# Patient Record
Sex: MALE | Race: BLACK OR AFRICAN AMERICAN
[De-identification: names, ages, dates, MRNs, and addresses within clinical notes are randomized per-mention and may not be internally consistent; named-entity substitution may affect disease eponyms.]

---

## 2019-05-08 NOTE — RAD
PORTABLE CHEST:

 

DATE: 5/8/2019.

 

PROVIDED CLINICAL HISTORY: 

Hypoxia.

 

FINDINGS:

No comparisons.  Cardiac silhouette appears enlarged which may be at least partially on the basis of 
portable technique.  Vascular calcification involves the aortic arch.  Left greater than right basila
r pleural parenchymal opacity.  Prominence of the pulmonary vasculature and pulmonary interstitium.  
No evidence for pneumothorax.  

 

IMPRESSION: 

Cardiomegaly and findings suggesting congestive failure.  Bibasilar left greater than right pleural p
arenchymal opacity may reflect effusions with adjacent atelectasis versus superimposed infection.  Fo
llowup is recommended.

 

POS: OFF

## 2019-05-08 NOTE — CT
CT ABDOMEN AND PELVIS:

 

HISTORY: 

Left-sided abdominal pain.

 

FINDINGS: 

Contrast-enhanced CT images of the abdomen and pelvis were obtained after administration of IV contra
st.  Oral contrast was not given.

 

Moderate-sized bilateral pleural effusions seen.

 

No evidence of free intraperitoneal air is seen.

 

There is a 1.5 cm hypodense area along the anterior aspect of the right hepatic lobe.  A more subtle 
smaller lesion is also seen in the left hepatic lobe measuring approximately 8 mm.  These may represe
nt hepatic cysts or masses including metastatic disease.

 

The gallbladder is unremarkable.  

 

There are numerous dilated loops of small bowel.  Some of these have herniated into a large left ingu
inal hernia extending into the scrotal sac.  This results in proximal left-sided small bowel obstruct
ion.

 

Multilevel lumbar degenerative change is seen.

 

IMPRESSION: 

1.  Large left inguinal hernia with small bowel herniation and obstruction.  Surgical consultation is
 recommended.

 

2.  Bilateral pleural effusions.

 

3.  Hypodense area seen in the liver.  These may represent hepatic cysts or masses.

 

POS: [facility]

## 2019-05-09 NOTE — RAD
Small bowel follow-through



HISTORY: Abdominal pain. Hernia. Bowel obstruction.



FINDINGS:  exam shows a large amount of stool throughout the colon. Prominent degenerative pinedo
es lumbar spine.



Early images show contrast within the nondilated proximal small bowel and within the stomach. At 1 ho
ur, contrast has filled the nondilated small bowel. Contrast is apparent within the right colon

extending to the hepatic flexure.







IMPRESSION: No evidence of bowel obstruction.



Reported By: JERI Fox 

Electronically Signed:  5/9/2019 12:02 PM

## 2019-05-09 NOTE — CON
DATE OF CONSULTATION:  



HISTORY OF PRESENT ILLNESS:  The patient is a 78-year-old  man, who

is currently incarcerated in one of the correctional facilities in our area, who

reportedly had complained of some abdominal pain, that he has taken to the [facility]

there.  He underwent evaluation and was noted to have irregular heart beat and some

hypoxia.  He was transferred to our facility where he underwent evaluation here.  It

was discovered that the patient was having CHF exacerbation, which he had no prior

history of and also was noted on a CT scan to have a large left inguinal hernia,

which we were asked to see in consultation.  The patient had a known hernia since

what he says greater than 5 years and according to his medical records, was noted in

2012.  The patient denies any nausea, vomiting, or diarrhea.  He states that his

bowel habits are regular.  He has a bowel movement every "few days" and his last one

was again "a few days ago."  The patient denies any urinary issues.  His chief

complaint was intermittent abdominal pain.  The patient is not the best historian

and a lot of his history is gleaned from his records from the intermediate facility. 



ALLERGIES:  NONE.



CURRENT MEDICATIONS:  Aspirin, iron supplement, omeprazole, lisinopril, and Novolin.



PAST MEDICAL HISTORY:  Hypertension, type 2 diabetes, hep C, and anemia.



PAST SURGICAL HISTORY:  Hernia repair.



SOCIAL HISTORY:  The patient is currently an inmate at one of the intermediate facilities

locally.  He denies drug, tobacco, or alcohol use. 



REVIEW OF SYSTEMS:  A 10-point review of systems is negative as otherwise stated.

Of note, this patient was examined last night in the emergency department and again

was seen this morning in consultation and this morning, he denies having any

abdominal pain to include yesterday. 



PHYSICAL EXAMINATION:

VITAL SIGNS:  Temperature is 97.7, heart rate 85, blood pressure 124/76,

respirations are 18, and oxygen saturation is 99% on 2 L via nasal cannula. 

GENERAL:  The patient is resting comfortably in bed.  He is awake, alert, and being

cooperative. 

HEENT:  Head is normocephalic and atraumatic.  Eyes, extraocular motion intact.

PERRLA bilaterally.  The patient does have some scleral icterus.  Oropharynx is

clear.  Nose is atraumatic without discharge.  Ears are atraumatic without

discharge. 

NECK:  Nontender.  Trachea is midline.  No JVD.  No lymphadenopathy is noted. 

CHEST:  Shows equal rise and fall of the chest with scattered rhonchi and occasional

wheezes. 

ABDOMEN:  Soft.  Flat with hypoactive bowel sounds.  Groin, a large obvious left

inguinal hernia is noted.  It does appear to slide, but is definitely not completely

reducible.  The patient did not have pain with this manipulation. 

BACK:  Atraumatic and nontender.



LABORATORY FINDINGS:  White blood cell count 8.8, hemoglobin 9.2, hematocrit 33.3,

and platelets are 396.  Sodium 137, potassium 4.1, chloride 103, CO2 of 30, BUN 11,

creatinine 0.69, glucose 117, and magnesium 1.8.  BNP 1844.  Troponin less than

0.010. 



RADIOGRAPHIC FINDINGS:  

1. AP chest x-ray shows cardiomegaly and findings suggestive of congestive heart

failure.  The bibasilar left greater than right pleural parenchymal opacity may

reflect effusion with adjacent atelectasis versus superimposed infection.  CT of the

abdomen and pelvis with IV contrast, one large left inguinal hernia with small-bowel

herniation and obstruction. 

2. Bilateral pleural effusions.

3. Hypodense area seen in the liver.  This may represent hepatic cyst or mass.



ASSESSMENT AND PLAN:  

1. Congestive heart failure exacerbation.

2. Left inguinal hernia with suspected obstruction.



PLAN:  Plan will be to have medical management per the primary team and we will

order a small bowel follow-through to evaluate for obstruction of his hernia.  At

this time, the patient is not a good candidate due to his anasarca and CHF

exacerbation.  We will continue to follow the patient and re-evaluate him after his

small bowel follow-through.  The patient was examined this morning with Dr. Santoyo on

the telemetry floor. 







Job ID:  [id number]

## 2019-05-09 NOTE — PRG
DATE OF SERVICE:  



SUBJECTIVE:  The patient is seen and examined at the bedside.  He does not have much

complaints to offer.  He wants to eat.  He does not have any abdominal pain.  He is

not short of breath. 



OBJECTIVE:  VITAL SIGNS:  Blood pressure is 137/77, pulse is 85, temperature is

97.5, respirations 18, O2 saturation is 100% on room air. 

HEENT:  His head is atraumatic and normocephalic.  Eyes are PERRLA.  Sclerae are

nonicteric.  Oral mucosa is moist. 

NECK:  Supple. 

LUNGS:  Clear. 

HEART:  S1 and S2.  Somewhat irregular.  No S3.  No S4. 

ABDOMEN:  Soft, nontender.  Bowel sounds present.  He has big scrotum, enlarged. 

EXTREMITIES:  He has upper extremity in plastic handcuffs.  He has 2+ peripheral

edema on both lower extremities and he has changes of both feet, which look like

chronic tinea. 

NEUROLOGIC:  He is alert and oriented x4.  There are no any motor deficits.  Cranial

nerves are intact. 



LABORATORY DATA:  Showed INR of 1.3, PT of 15.8, PT 29.1.  Normal electrolytes.  CO2

of 8, BUN 11, creatinine 0.69, glucose 117, calcium 8.6, magnesium 1.8. 



IMPRESSION:  

1. Acute congestive heart failure exacerbation.  The patient is on Lasix and ACE

inhibitor.  Cardiology consultation is still pending.  Echocardiogram is pending. 

2. Uncontrolled diabetes mellitus.  We will try to maximize the treatment.

3. Large left inguinal hiatal hernia with a normal small bowel series, no

obstruction.  Surgery is postponed for the time when he is not in congestive heart

failure. 

4. Bilateral pleural effusion secondary to congestive heart failure, on diuretics.

5. Acute hypoxic respiratory failure, improved.

6. Microcytic anemia.  We will do iron studies and guaiac on his stool.







Job ID:  [id number]

## 2019-05-09 NOTE — HP
PRIMARY CARE DOCTOR:  The patient is in alf.



CODE STATUS:  Full code.



TIME OF EVALUATION:  8:30 p.m.



CHIEF COMPLAINT:  Abdominal pain.



HISTORY OF PRESENT ILLNESS:  Information has been gathered from the nursing 
staff

since the patient is noncooperative.  He is confused.  The patient was brought 
in to

the hospital from correctional facility since the patient was having episodes of

hypoxia, also having irregular heartbeats, no clear triggers, no alleviating

factors.  Reportedly, this was his first episode.  Symptoms were moderate, 
gradual

onset.  Symptoms were getting worse with change in positions. 



REVIEW OF SYSTEMS:  Unable to obtain.  The patient is noncooperative to 
interview.



KNOWN ALLERGIES:  No known drug allergies.



FAMILY HISTORY:  Reviewed and noncontributory for current presentation.



REPORTED MEDICATIONS:  

1. Aspirin.

2. Iron.

3. Omeprazole.

4. Lisinopril.

5. Novolin.



PHYSICAL EXAMINATION:

VITAL SIGNS:  On presentation, blood pressure 142/91 with heart rate 112,

respiratory rate was 17, temperature 97.7, pain 8/10, oxygen saturation was 98% 
on

room air. 

GENERAL APPEARANCE:  The patient is alert, disoriented, not in acute distress. 

HEENT:  Eyes, normal conjunctivae.  Dry oral mucosa.  Anicteric.  No JVD. 

RESPIRATORY:  The patient has decreased air entry.  The patient has bilateral 
rales.

 No wheezing. 

CARDIOVASCULAR:  Normal rate, regular rhythm.  No murmurs.  No gallop.  
Bilateral

leg edema. 

ABDOMEN:  Soft.  Normal bowel sounds. MUSCULOSKELETAL:  Baseline range of 
motion and

strength.  No tenderness. 

SKIN:  Warm, intact.  No pallor.  No rash.  No redness.  Peripheral pulses are

present.  Capillary refill seems to be intact. 

NEUROLOGICAL:  No evidence of any new focal weakness.  Baseline speech.  Cranial

nerves seems to be intact. 

PSYCHIATRIC:  The patient has good mood.  No anxiety.  Suboptimal judgment.



IMAGING STUDIES:  EKG was reviewed.  The patient has sinus tachycardia with PACs
,

nonspecific T-wave abnormalities, ventricular rate 111, , QRS 88, QT 
corrected

454. 



LABORATORY DATA:  Reviewed.  White count 8.8, hemoglobin 9.2, platelet count 
396.

Sodium 137, potassium 4.4, chloride 107, carbon dioxide 22, anion gap 12, BUN 17
,

creatinine 0.72, GFR greater than 90, glucose 192, calcium 9.0, total bilirubin 
0.4,

AST 53, ALT 97.  Troponin was negative.  B-type natriuretic peptide 1844.  Serum

total protein is 7.0, albumin 3.7, globulin 3.3, albumin to globulin ratio 1.1.

Lipase 4. 



ASSESSMENT AND PLAN:  The patient will be placed in the hospital with following

medical problems: 

1. Acute congestive heart failure exacerbation.  The patient improved with Lasix
, we

will continue diuresis, we will reconcile home medications.  We will adjust

treatment as needed. 

2. Microcytic anemia.  The patient has hemoglobin 9.2 with MCV of 69 to be 
secondary

to iron deficiency, might be a component of thalassemia given low MCV.  The 
patient

has hypochromia anisocytosis.  We will start iron, this can be followed as

outpatient. 

3. Uncontrolled diabetes.  The patient presented with glucose of 192, reconcile 
home

medications, sliding scale for optimal control. 

4. Large left inguinal hernia with small bowel herniation and obstruction.  
Surgery

was consulted and was endorsed that Surgery might be willing to take him to OR 
in

the morning. 

5. Bilateral pleural effusion secondary to congestive heart failure.  We will

continue 

diuresis.

6. Acute hypoxic respiratory failure, likely secondary to congestive heart 
failure.

We 

will treat underlying condition.

7. Deep venous thrombosis prophylaxis.







Job ID:  [id number]



[facility]

## 2019-05-09 NOTE — CON
DATE OF CONSULTATION:  05/09/2019



REASON FOR CONSULTATION:  Heart failure.



HISTORY OF PRESENT ILLNESS:  Mr. Lopez is a very pleasant 78-year-old 

American gentleman, who is an inmate, who comes to the hospital for being altered

and having episodes of irregular heartbeats and shortness of breath.  He was

admitted and there was a concern for abdominal obstruction, but this was ruled out

with a follow-through, and he was started on IV Lasix as his clinical scenario

seemed to be heart failure.  He was given Lasix and felt much better.

Echocardiogram was done and it showed an EF of 15% to 20%, so Cardiology has been

consulted for this.  On my evaluation, Mr. Lopez denies any chest pain, tightness,

or pressure.  He tells me he has never been told he has a weak heart in the past.

He feels much better and is able to lay flat now. 



PAST MEDICAL HISTORY:  

1. Hypertension.

2. Type 2 diabetes.

3. Hepatitis C.

4. Chronic anemia.



OUTPATIENT MEDICATIONS:  

1. Omeprazole 40 mg b.i.d.

2. Novolin R.

3. NPH insulin.

4. Lisinopril 20 mg a day.

5. Ferrous sulfate 325 mg b.i.d.

6. Aspirin 81 a day.



ALLERGIES:  NO KNOWN DRUG ALLERGIES.



SOCIAL HISTORY:  Currently, an inmate.  No alcohol, tobacco, or drugs.



FAMILY HISTORY:  Noncontributory.



REVIEW OF SYSTEMS:  A 12-point review of systems was done and was found to be

negative unless stated in the history of present illness. 



PHYSICAL EXAMINATION:

VITAL SIGNS:  Temperature 97.5, pulse 84, respiratory rate 16, saturating 97% on

room air, and blood pressure 140/85. 

GENERAL:  Awake, alert, and oriented x3.  No distress. 

HEENT:  Normocephalic and atraumatic. 

NECK:  Supple. 

LUNGS:  Clear. 

CARDIOVASCULAR:  S1 and S2.  No S3 or S4.  No murmurs. 

ABDOMEN:  Soft.  Positive bowel sounds. 

EXTREMITIES:  1+ edema. 

SKIN:  Warm and dry.



LABORATORY DATA:  Laboratory work was reviewed.  CBC with a white count of 8.8,

hemoglobin of 9.2, hematocrit of 33, and platelet count of 396.  Coags were normal.

Chemistries with a BUN of 11, creatinine 0.69, GFR of greater than 90.  Lactic acid

was normal.  BNP was 1844. 



IMAGING STUDIES:  Echocardiogram was reviewed, it showed an EF of 15% to 20%, grade

2/3 diastolic dysfunction, and global hypokinesis. 



ASSESSMENT:  

1. New-onset dilated cardiomyopathy.

2. Acute on chronic systolic heart failure.



PLAN:  

1. We will need further risk stratification with heart catheterization for ischemic

cardiomyopathy.  We will plan on doing this tomorrow.  We spoke about the risks and

benefits of the procedure.  Risks included, but not limited to stroke, MI, death,

bleeding, need for blood transfusion, limb loss, organ loss, need for emergent

bypass surgery.  He understands and verbalized understanding and agrees to 

proceed.

2. We will get a LifeVest before discharge.

3. Start on beta blocker, already on an ACE inhibitor.

4. Further recommendations per results of coronary angiogram.  We will follow.







Job ID:  [id number]

## 2019-05-10 NOTE — PRG
DATE OF SERVICE:  05/10/2019



SUBJECTIVE:  The patient is seen and examined at the bedside.  He just came back

from cardiac catheterization procedure.  He does not have much complaints to offer.

He just ate his lunch. 



OBJECTIVE:  VITAL SIGNS:  Blood pressure is 119/77, pulse is 75, temperature is

97.7, respiratory rate 14, O2 saturation is 97% on room air. 

HEENT:  His head is atraumatic and normocephalic.  Sclerae are nonicteric.  Pupils

are responding to light properly.  Oral mucosa is moist. 

NECK:  Supple. 

LUNGS:  Breath sounds diminished at both bases with few crackles bilaterally.  No

wheezing. 

HEART:  S1, S2, somewhat distant.  No S3.  No S4. 

ABDOMEN:  Soft, nontender, nondistended. 

EXTREMITIES:  Upper and lower extremity in plastic cuffs.  He has 2+ peripheral

edema on both lower extremities. 

NEUROLOGICAL:  He is alert and oriented x4.  There are no any motor deficits.

Cranial nerves are intact. 



LABORATORY DATA:  Labs showed glycemia is ranging from 79 to 201.  INR 1.3.  PT

15.8, APTT 29.1.  Sodium of 137, potassium 4.1, chloride 103, CO2 of 30, BUN 11,

creatinine 0.69.  Echocardiogram showed LVEF of 15% to 20%.  Grade 2/3 diastolic

dysfunction.  Global hypokinesia, dilated RV with reduced RV systolic function.

Severely dilated left atrium, markedly enlarged right atrium.  Moderate mitral

regurgitation, moderate tricuspid regurgitation, elevated right ventricular systolic

pressure estimated at 58 mmHg.  Moderate-size pleural effusion. 



IMPRESSION:  

1. Acute congestive heart failure exacerbation.  Left ventricular ejection fraction

low on his echo.  He just came back from cardiac cath.  We are waiting for final

report. 

2. Uncontrolled diabetes mellitus.

3. Large left hiatal hernia with a normal small bowel series suggestive of no

obstruction and no surgical intervention recommended per surgical team at this

point. 

4. Bilateral pleural effusion secondary to congestive heart failure, on diuretics.

5. Acute hypoxemic respiratory failure, improved.

6. Microcytic anemia, iron deficiency.  Awaiting for guaiac stool.  We will obtain

GI consult for possible scoping and diuretics twice a day.  Continue ACE inhibitor.

Continue beta-blocker.  Continue insulin, Lantus, NPH, and sliding scale, and

aspirin 81 mg. 







Job ID:  [id number]

## 2019-05-11 NOTE — CON
DATE OF CONSULTATION:  05/11/2019



REASON FOR CONSULTATION:  Iron-deficiency anemia.



HISTORY OF PRESENT ILLNESS:  Matheus Lopez is a 78-year-old 

gentleman, an inmate, who was admitted to the hospital 3 days ago with altered

mental status, hypoxia, dyspnea, and irregular heartbeat.  Upon presentation, there

was also concern for possible incarceration of a long-standing left inguinal hernia.

 He was evaluated by surgery and had normal small bowel follow-through and hernia

was not felt to be in danger of incarceration.  He had some cardiac workup including

echocardiogram and this demonstrated significant dilated cardiomyopathy with

ejection fraction only 15% to 20% and global hypokinesis.  Cardiology saw the

patient and performed a cardiac catheterization.  He does not have significant

coronary artery disease, so this is classified as a nonischemic cardiomyopathy.

Arrangements are being made for the patient to have a LifeVest.  He has received IV

Lasix.  During evaluation, it is also apparent that the patient is anemic.  It is

unclear how chronic this is.  The patient himself denies any knowledge of a history

of anemia.  Hemoglobin is stable at 9.3, and it is microcytic with MCV of 68.2.

Iron studies are low with ferritin only 10.5.  FOBT is negative.  The patient states

that he has no gastrointestinal symptoms at all.  He denies any heartburn, abdominal

pain, nausea, vomiting, diarrhea, constipation, melena, or hematochezia.  He denies

any overt bleeding from anywhere.  No epistaxis.  No gross hematuria.  He is not on

any special diet.  He does not think he has ever undergone EGD.  He does think he

had a colonoscopy that was normal and it would have been several years ago.  He is

not interested in undergoing any endoscopic procedures and is wanting to minimize

further interventions and testing if possible. 



PAST MEDICAL HISTORY:  Hypertension, diabetes type 2, hepatitis C, chronic anemia,

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

effusion. 



ALLERGIES:  NO KNOWN DRUG ALLERGIES.



OUTPATIENT MEDICATIONS:  

1. Omeprazole 40 mg b.i.d.

2. Novolin R.

3. NPH insulin.

4. Lisinopril 20 mg daily.

5. Ferrous sulfate 325 mg b.i.d.

6. Aspirin 81 mg daily.



SOCIAL HISTORY:  Currently, an inmate.  No alcohol, tobacco, or drug use.



FAMILY HISTORY:  Noncontributory.  No known family history of GI malignancy.



REVIEW OF SYSTEMS:  Full review of systems including constitutional, head, eyes,

ears, nose, throat, GI, , cardiovascular, respiratory, musculoskeletal, and

neurologic systems are negative except as noted in the HPI. 



PHYSICAL EXAMINATION:

VITAL SIGNS:  Temperature 98.3, pulse 93, blood pressure 120/64, and 97% oxygen

saturation on room air. 

GENERAL:  A 78-year-old man, lying in bed comfortably, in no distress. 

MENTAL:  He is alert and oriented.  He is able to answer detailed questions about

current symptoms. 

SKIN:  He is a bit pale.  No jaundice.  No rashes were palpable. 

EYES:  No scleral icterus.  Extraocular movements intact. 

ENT:  Mucous membranes moist.  No oral lesions. 

LYMPH:  No submandibular supraclavicular lymphadenopathy.  Thyroid nontender to

palpation. 

HEART:  Regular rate and rhythm. 

LUNGS:  Clear to auscultation bilaterally. 

ABDOMEN:  Flat.  Bowel sounds present.  Soft and nontender to palpation throughout. 

EXTREMITIES:  No peripheral edema. 

VESSELS:  Radial pulses 2+ bilaterally. 

NEUROLOGIC:  Cranial nerves 2 through 12 intact bilaterally.  No focal deficits.



LABORATORY STUDIES:  Hemoglobin 9.3, WBC 9.0, platelets 181, MCV is low at 68.2.

INR is 1.3.  Sodium 136, potassium 3.6, BUN 18, creatinine 0.67, glucose 260,

calcium 8.5, iron less than 8, TIBC 301, ferritin only 10.5.  BNP elevated to

1844.9.  Total bilirubin 0.4, alkaline phosphatase 135, AST 53, ALT 97, lipase 4,

and albumin 3.7. 



IMAGING STUDIES:  Echocardiogram demonstrates global hypokinesis and ejection

fraction only 15% to 20%.  Coronary angiogram shows no significant coronary artery

disease.  On 05/09/2019, small bowel follow-through showed no evidence of any bowel

obstruction.  CT of the abdomen and pelvis on admission 05/08/2019, showed normal

gallbladder.  There are a couple of hypodense areas in the liver measuring up to 1.5

cm representing either hepatic cyst or possibly masses including metastatic disease.

 Gallbladder is unremarkable.  Some loops of small bowel within left inguinal hernia

sac extending into the scrotum. 



ASSESSMENT AND PLAN:  

1. Iron-deficiency anemia.

2. Indeterminate hepatic lesions, possibly representing cyst versus solid tumors

including possibly malignant disease. 

3. Severe nonischemic cardiomyopathy with ejection fraction 15% to 20%.  I had a

long discussion with the patient regarding his iron-deficiency anemia.  This is

concerning for possible occult gastrointestinal bleeding lesion.  I note his FOBT is

negative, but this does not rule out an occult gastrointestinal bleeding lesion.

The patient would be high risk for any endoscopic procedure with his severe

congestive heart failure, but I would be willing to consider

esophagogastroduodenoscopy and colonoscopy if the patient wanted to proceed.  The

patient expresses understanding of the risks and benefits either way, and he

strongly declines any endoscopic investigation.  He is wanting to minimize any

further testing.  He expresses understanding of the risk of missing significant

pathology.  I would honor his wishes in this regard.  I did tell him that if he

changes his mind, then we can be reconsulted for consideration of

esophagogastroduodenoscopy and colonoscopy.  I would need formal cardiac clearance

prior to proceeding if that were the case. 



GI will sign off at this time given the patient's wishes, but please call back

anytime with questions or concerns. 







Job ID:  [id number]

## 2019-05-11 NOTE — PRG
DATE OF SERVICE:  05/11/2019



SUBJECTIVE:  The patient is seen and examined at the bedside.  Two guards present,

one of them is in the room and one is in front of the room.  The patient feels tired

and he could not sleep last night and he is trying to catch up on his sleep this

morning. 



OBJECTIVE:  VITAL SIGNS:  Blood pressure is 127/82, temperature is 97.4, pulse is

98, respirations 14, and O2 saturation is 100% on room air. 

GENERAL:  He follows my commands. 

HEENT:  His head is atraumatic.  Sclerae are nonicteric.  Oral mucosa is moist. 

NECK:  Supple. 

LUNGS:  Breath sounds diminished at both bases with some dullness on percussion on

both bases.  No wheezing. 

HEART:  S1 and S2, somewhat distant.  No S3.  No S4. 

ABDOMEN:  Soft, nontender, and nondistended. 

EXTREMITIES:  Approximately 2+ peripheral edema around both ankles.  He has quite

diffuse changes on his both feet, which looks chronic. 

NEUROLOGICAL:  He is alert and oriented x4.  There are no any motor deficits.



LABORATORY DATA:  White count of 9.0, hemoglobin is 9.3, hematocrit 33.8, and

platelet count is 181,000.  Normal electrolytes, normal creatinine, glycemia is

ranging from 79 to 276, and calcium is 8.5.  Microbiology, KOH preparation on skin

of his feet came back negative for fungal infection. 



IMPRESSION AND PLAN:  

1. Acute congestive heart failure exacerbation with low left ventricular ejection

fraction, status post cardiac cath.  We are waiting for the final report. 

2. Uncontrolled diabetes mellitus.  He is on 6 units of NPH twice a day.  I will

increase morning dose to 10 units and continue 6 units in the evening. 

3. Large left inguinal hernia without obstruction.

4. Bilateral pleural effusions secondary to congestive heart failure.

5. Acute hypoxemic respiratory failure, improved.

6. Microcytic anemia, iron deficiency with negative guaiac stool.  GI consult

placed.  For now, we will continue his current regimen with ACE inhibitor,

beta-blocker. 





Job ID:  [id number]

## 2019-05-12 NOTE — PRG
DATE OF SERVICE:  05/12/2019



SUBJECTIVE:  I was called back to see Mr. Lopez this morning because of acute onset

abdominal pain.  The patient reports that the pain is throughout his entire abdomen.

 It is really unable to localize it for me.  This is associated with nausea.  He did

have one episode of nonbloody emesis already.  I do note he has this large left

inguinal hernia which persists and I am unable to reduce in any way. 



OBJECTIVE:  VITAL SIGNS:  Temperature 98.1, pulse 87, blood pressure 136/66, 100%

oxygen saturation on room air. 

GENERAL:  A 78-year-old man, lying in bed, in moderate distress from pain and

nausea. 

HEART:  Regular rate and rhythm. 

LUNGS:  Clear to auscultation bilaterally. 

ABDOMEN:  The abdomen is nondistended, but it is tense.  He endorses diffuse

tenderness to palpation.  Bowel sounds are hypoactive.  He has a large left inguinal

hernia which is not reducible. 

EXTREMITIES:  No peripheral edema.



LABORATORY STUDIES:  WBC 7.3, hemoglobin 8.5, platelets 288.  INR 1.3.  Sodium 136,

potassium 3.1, BUN 18, creatinine 0.64. 



ASSESSMENT/PLAN:  

1. Acute generalized abdominal pain this morning.

2. Large left-sided inguinal hernia.  Note on patient's presentation a few days ago

there was concern for small bowel obstruction due to his large left inguinal hernia.

 This was apparent on initial CT imaging on 05/08/2018.  By the time of small bowel

follow-through 05/09/2019, there was no further evidence of obstruction.  However, I

think his pain today most likely represents repeat incarceration and obstruction.  I

will call the surgical team and requests them to re-evaluate.  Obviously, the

patient is not a great operative candidate. 

3. Iron deficiency anemia.  The patient strongly declined any endoscopic

investigation 

with regard to his iron deficiency anemia.  Again, the patient is not a great

candidate for any procedures.  The plan had been to transfer him to [facility] for 

LifeVest, and I would still prefer this to be accomplished prior to any plan for

endoscopy.  Hemoglobin is stable. 







Job ID:  [id number]

## 2019-05-12 NOTE — PRG
DATE OF SERVICE:  05/12/2019



SUBJECTIVE:  Mr. Lopez is a 78-year-old  man, an inmate of a

correctional facility. 



The patient was admitted with acute congestive heart failure and vague abdominal

pain associated with a longstanding left inguinal hernia for over five years

duration. 



The congestive heart failure has been successfully treated. 



The patient ultimately began to have bowel movement.  Small bowel followthrough

revealed no small bowel obstruction. 



Herniorrhaphy was not recommended at that time. 



This morning; however, the patient is complaining of severe left groin pain

associated with a large nonreducible bulge down to his scrotum. 



He is complaining of some nausea with some nonbilious emesis x2.



OBJECTIVE:  VITAL SIGNS:  Today includes blood pressure 177/74, pulse 106,

respiratory rate is 20, temperature 97.7 degrees Fahrenheit, and oxygen saturation

97% on room air. 

HEART:  Reveals irregular rate and irregular rhythm. 

LUNGS:  Clear to auscultation bilaterally.  Breathing, regular and nonlabored. 

ABDOMEN:  Soft and nondistended.  He has a large nonreducible bulge to his left

scrotum consistent with an incarcerated left inguinal hernia. 

NEUROLOGIC:  Reveals no focal deficits present.



LABORATORY FINDINGS:  Today include a CBC with 7300 white blood cells, hemoglobin

and hematocrit 8.5 and 30.0 respectively. 



Platelet count is 288,000. 



Metabolic profile; sodium 136, potassium 3.1, chloride is 99, bicarb 32, BUN 18,

creatinine 0.64, and glucose is 146. 



IMPRESSION:  Incarcerated left inguinal hernia.



PLAN:  Left inguinal herniorrhaphy with mesh. 



Above findings and plan discussed with the patient, who indicates understanding of

information given. 



We will obtain consent for surgical intervention from the correctional facility.







Job ID:  [id number]

## 2019-05-12 NOTE — PRG
DATE OF SERVICE:  05/12/2019



SUBJECTIVE:  The patient is seen and examined at the bedside.  He complains about

abdominal pain, quite severe all of a sudden, but apparently he was doing quite well

this morning. 



OBJECTIVE:  VITAL SIGNS:  Blood pressure is 177/74, pulse is 106, temperature is

97.7, respiratory rate is 20, and O2 saturation is 97% on room air. 

GENERAL:  He is in quite severe pain, it is in the abdomen.  His eyes are not

showing any abnormalities. 

HEENT:  His sclerae are nonicteric.  Oral mucosa is moist. 

NECK:  Supple. 

LUNGS:  Clear. 

HEART:  S1 and S2, somewhat tachycardic.  No S3.  No S4. 

ABDOMEN:  Tender.  The pain is more localized to the left side of the abdomen and

most likely this is incarcerated hernia. 

EXTREMITIES:  No clubbing, cyanosis, or edema.



LABORATORY DATA:  White count of 7.3, hemoglobin of 8.5, hematocrit 67.2, and

platelet count is 288,000.  Sodium of 136, potassium 3.1, chloride 99, CO2 of 32,

BUN 18, creatinine 0.64, and glucose 55. 



IMPRESSION:  

1. Abdominal pain, most likely incarcerated hernia.  Apparently, Dr. Barrientos talked

already to Dr. Santoyo and the patient is going for surgery very soon.  We will try to

use some morphine to relieve the pain for now, 4 mg IV push. 

2. Hypokalemia.  We will replace with two runs of KCl.

3. Acute congestive heart failure exacerbation with lower left ventricular ejection

fraction, status post cardiac catheterization.  Apparently, there were no blockages

in his coronary artery disease, so this is nonischemic cardiomyopathy. 

4. Uncontrolled diabetes mellitus.  The patient was hypoglycemic.  This morning, we

will have to watch this closely since we went up on his long-acting insulin

recently. 

5. Bilateral pleural effusion secondary to his congestive heart failure.

6. Acute hypoxemic respiratory failure, improved.

7. Microcytic anemia with iron deficiency and positive guaiac stool.



DISCUSSION:  The patient is going for surgery by General Surgery for his most likely

incarcerated inguinal hernia.  We will replace his potassium with two runs of KCl 10

mEq each.  We just learned that he cannot have LifeVest through our system and he

needs to be transferred to [facility] in [place] or [place], so this transfer has

to be postponed until his surgery is done and he is ready to be transferred.  He is

more stable.  Also, he will need GI scoping since he has microcytic anemia and

guaiac-positive stools. 







Job ID:  [id number]

## 2019-05-13 NOTE — PRG
DATE OF SERVICE:  05/13/2019



SUBJECTIVE:  The patient denies any new complaints.  No new chest pain, shortness of

breath, palpitations.  He feels generally weak and fatigued. 



MEDICATIONS:  Current medications were reviewed.  The patient is on aspirin, Lasix,

lisinopril, Toprol-XL, with NPH. 



REVIEW OF SYSTEMS:  The patient has no bowel movement for last 2 days.  No nausea,

vomiting.  Poor appetite. 



OBJECTIVE:  VITAL SIGNS:  Temperature 98, pulse 87, respirations of 15, blood

pressure of 116/64, O2 saturation 100% on room air. 

GENERAL:  A 78-year-old male, in no apparent distress. 

LUNGS:  Showed diminished air entry at bilateral bases with few rales at bases. 

HEART:  S1, S2 present.  Regular rate and rhythm. 

ABDOMEN:  Soft.  Bowel sounds present. 

NEUROLOGIC:  Grossly nonfocal.



LABORATORY FINDINGS:  WBC 14, hemoglobin 8.6, hematocrit 30, platelet count 272.

Sodium 135, potassium 4, BUN 15, and creatinine 0.76. 



Telemetry monitoring by my review showed sinus rhythm.



IMPRESSION:  

1. Acute systolic and diastolic heart failure exacerbation with ejection fraction of

15% to 20% secondary to nonischemic cardiomyopathy. 

2. Incarcerated left inguinal hernia, status post repair on 05/12/2019.

3. Iron-deficiency anemia.

4. Bilateral pleural effusion secondary to #1.

5. Diabetes mellitus type 2.

6. Chronic hepatitis C.

7. Physical deconditioning.



PLAN:  The patient will be transferred to [facility] for further management.  The patient

will need LifeVest due to cardiomyopathy.  We will continue MiraLAX.  We will

probably change Lasix to oral in a.m.  We will continue beta-blockers.  Continue ACE

inhibitor.  The patient was counseled on congestive heart failure.  We will recheck

labs in a.m.  Continue all other medications as below. 







Job ID:  [id number]

## 2019-05-13 NOTE — PDOC.CTH
Cardiology Progress Note





- Subjective





No new issues. 





- Objective


 Vital Signs











  Temp Pulse Resp BP BP Pulse Ox


 


 05/13/19 17:17  98.0 F  87  15   116/64  100


 


 05/13/19 12:33  97.9 F  98  15   111/65  100


 


 05/13/19 09:47     125/70  


 


 05/13/19 09:45  98.0 F  97  16   125/70  100








 











Admit Weight                   171 lb


 


Weight                         171 lb














 











 05/12/19 05/13/19 05/14/19





 06:59 06:59 06:59


 


Intake Total 1020 1430 365


 


Output Total  1050 625


 


Balance 1020 380 -260














- Physical Examination


General/Neuro: alert & oriented x3, NAD


Neck: no JVD present


Lungs: unlabored respirations


Heart: RRR


Abdomen: NT/ND


Extremities: other: (no edema)





- Telemetry


Telemetry Rhythm: NSR





- Labs


Result Diagrams: 


 05/13/19 04:21





 05/13/19 04:21


 Troponin/CKMB











Troponin I  0.010 ng/mL (< 0.028)   05/08/19  13:06    














- Assessment/Plan





1. Non ischemic CM


2. EF at 20-25%


3. S/P hernia repair. 





PLAN:


- Lasix to PO BID scheduled. 


- Continue BB and ACEI. 


- He cannot have a lifevest from us, he needs to go back to his residential and it 

will be arranged from there. 


- From cardiac perspective he is on medical therapy and may be discharged back 

to his unit with follow up with Cardiologist at [facility].


- He will need close follow up as he is high risk for re admission.

## 2019-05-13 NOTE — OP
DATE OF PROCEDURE:  05/12/2019



PREOPERATIVE DIAGNOSIS:  Incarcerated large left inguinal hernia.



POSTOPERATIVE DIAGNOSES:  

1. Incarcerated large indirect left inguinal hernia.

2. Large direct inguinal hernia, not incarcerated.



OPERATION PERFORMED:  Repair of incarcerated left inguinal hernia with PerFix light

plug. 



ANESTHESIA:  General endotracheal.



ESTIMATED BLOOD LOSS:  10 mL.



FLUIDS GIVEN:  1200 mL crystalloids.



COUNTS:  Sponge and instrument counts were verified as correct x2.



COMPLICATIONS:  None apparent at the time of operation.



INDICATIONS FOR OPERATION:  A 78-year-old man, an inmate of correctional facility,

with long-standing large left inguinal hernia. 



The patient presented with acute and large bulge in his left groin with severe pain

and failure to reduce. 



An incarcerated left inguinal hernia is diagnosed and the patient was brought to the

operating room for repair. 



Findings are consistent with giant incarcerated indirect left inguinal hernia, as

well as large non incarcerated direct left inguinal hernia. 



DESCRIPTION OF PROCEDURE:  Informed consent was obtained from the patient.  He was

brought to the operating room and placed in supine position. 



Following general anesthesia, abdomen including the groin was sterilely prepped and

draped in usual fashion.  The scrotum was separately prepped and draped. 



An oblique incision was made in the left groin using a 15 scalpel.  Incision was

carried through subcutaneous tissues maintaining hemostasis. 



Superficial vessels were divided and cauterized. 



Denisse's fascia was incised along the line of the incision. 



External oblique aponeurosis was completely attenuated. 



The patient was placed in a Trendelenburg position and the large indirect hernia was

reduced. 



The hernia sac and cord structures were dissected free from surrounding structures

and encircled with a Penrose drain. 



The large indirect hernia sac was then dissected off the cord structures.  Care was

taken to avoid injuries to the contents of cord. 



Vas deferens was identified and placed out of harm's way with remainder of the cord

structures. 



Large hernia sac was opened and contents were reduced into the peritoneal cavity. 



This was twisted and divided the high inguinal position and the stump was ligated

using a stick tie of 0 Vicryl and doubly ligated with a free tie of 0 Vicryl. 



The large direct hernia defect was also identified. 



At this juncture, the plug part of the PerFix plug mesh was placed in the internal

ring and sutured circumferentially to internal oblique aponeurosis. 



The elliptical mesh piece was then brought into the operative field.  The apex was

sutured to the pubic tubercle and allowed to encircle the cord. 



This was sutured laterally to the external oblique fascia and medially to the

internal oblique aponeurosis. 



This was achieved using running stitch of 2-0 Prolene. 



What was left of the external oblique aponeurosis was loosely approximated over the

repair using running stitch of 0 Vicryl suture. 



Denisse's fascia was approximated using interrupted sutures of 2-0 Vicryl. 



Wound bed was infiltrated with 0.25% Marcaine with epinephrine. 



Skin incision was closed using a running stitch of 4-0 Monocryl suture in

subcuticular fashion. 



Dermabond was applied over incisional closure. 



The patient tolerated the operation without any apparent complication and was

returned to recovery room in satisfactory condition. 







Job ID:  [id number]

## 2019-05-13 NOTE — PRG
DATE OF SERVICE:  05/13/2019



SUBJECTIVE:  This is a 78-year-old gentleman, inmate of correctional facility.  The

patient was admitted for acute congestive heart failure and vague abdominal pain

associated with long-standing left inguinal hernia for over the past five years.

The patient is postoperative day #1, repair of incarcerated left inguinal hernia

with PerFix light plug.  The patient had no overnight events. 



OBJECTIVE:  VITAL SIGNS:  Temperature 98.0, pulse 97, respirations are 16, SpO2 of

100% on room air, and blood pressure 125/70. 

GENERAL:  The patient is awake and alert, in no distress. 

HEART:  Irregular rhythm. 

LUNGS:  Clear to auscultation, breathing regular and nonlabored. 

ABDOMEN:  Soft, nontender, and nondistended.  Incision well approximated with no

signs of drainage or infection. 

NEUROLOGIC:  No focal deficits.



LABORATORY DATA:  WBC 14.0, RBC 4.45, hemoglobin 8.6, hematocrit 30.0, and platelets

are 272.  Sodium 135, potassium 4.0, chloride 99, BUN 15, creatinine 0.76, estimated

GFR greater than 90, calcium 8.6, glucose 215. 



Diagnostics:  Surgical specimen pending.



IMPRESSION:  Left incarcerated inguinal hernia repair.



PLAN:  From a surgical standpoint, the patient can be discharged.  No heavy lifting

greater than 25 pounds for 2 weeks.  The patient is to follow up in 2 weeks with

Trauma. 



The patient was examined with Dr. Zamora during morning rounds.







Job ID:  [id number]

## 2019-05-14 NOTE — DIS
DATE OF ADMISSION:  05/08/2019



DATE OF DISCHARGE:  05/14/2019



DISCHARGE DISPOSITION:  The patient is an inmate.



DISCHARGE INSTRUCTIONS:  

1. The patient was advised to follow up with Cardiology as well as General Surgery

at [facility].  No heavy lifting for 2 weeks. 

2. The patient was extensively counseled on congestive heart failure.



DISCHARGE MEDICATIONS:  

1. Lasix 40 mg b.i.d.

2. Toprol-XL 25 mg daily.

3. Multivitamin one tablet daily.

4. MiraLAX daily.

5. Senokot-S 1 tablet b.i.d.

6. Lisinopril 20 mg daily.

7. Omeprazole 40 mg b.i.d.

8. Novolin R sliding scale, Novolin N 6 units b.i.d.

9. Ferrous sulfate 325 mg b.i.d.

10. Aspirin 81 mg daily. 



The patient was seen and examined on the day of discharge.  Denies any new

complaints.  No chest pain, shortness of breath, or palpitations reported. 



LifeVest to be arranged by the cardiologist at [facility].



BRIEF HOSPITAL COURSE:  The patient is a 78-year-old male, who presented on March 08, 2019 with abdominal discomfort.  His workup was consistent with congestive heart

failure exacerbation along with large left inguinal hernia along with incarceration.

 He was monitored on the telemetry unit.  He was seen by Cardiology as well as

General Surgery.  Echocardiogram was obtained that showed ejection fraction of 15%

to 20% with grade 2/3 diastolic dysfunction, moderate mitral regurgitation, mild

aortic regurgitation, and moderate tricuspid regurgitation.  A cardiac

catheterization was performed on May 10, 2019 that showed no significant coronary

artery disease.  LifeVest was recommended, however, this needs to be arranged by

[facility]. 



The patient also underwent repair of the incarcerated left inguinal hernia on May

12, 2019.  The patient has been cleared by General Surgery as well as Cardiology for

discharge.  He was extensively counseled on congestive heart failure.  His weight on

the day of discharge is 173 pounds.  He is saturating 100% on room air. 



FINAL DIAGNOSES:  

1. Acute systolic and diastolic heart failure exacerbation with ejection fraction

15% to 20% secondary to nonischemic cardiomyopathy.  ACC stage C. 

2. Incarcerated left inguinal hernia, status post repair.

3. Iron-deficiency anemia.

4. Bilateral pleural effusion secondary to #1.

5. Diabetes mellitus type 2.

6. Chronic hepatitis C.

7. Physical deconditioning.

8. Hyponatremia.

9. Hypokalemia.

10. __________.

11. Abnormal LFTs secondary to passive hepatic congestion.

12. Chronic anemia.

13. Bacteriuria without pyuria.

14. Moderate tricuspid and mitral regurgitation.

15. Mild aortic regurgitation.



TIME SPENT WITH PATIENT:  Total time coordinating the discharge of this patient was

37 minutes. 







Job ID:  [id number]

## 2020-05-02 ENCOUNTER — HOSPITAL ENCOUNTER (EMERGENCY)
Dept: HOSPITAL 18 - NAV ERS | Age: 80
LOS: 1 days | Discharge: ANOTHER HOSPITAL | End: 2020-05-03
Payer: COMMERCIAL

## 2020-05-02 DIAGNOSIS — Z79.891: ICD-10-CM

## 2020-05-02 DIAGNOSIS — E11.649: ICD-10-CM

## 2020-05-02 DIAGNOSIS — Z79.82: ICD-10-CM

## 2020-05-02 DIAGNOSIS — D64.9: ICD-10-CM

## 2020-05-02 DIAGNOSIS — I46.9: Primary | ICD-10-CM

## 2020-05-02 DIAGNOSIS — F03.90: ICD-10-CM

## 2020-05-02 DIAGNOSIS — T68.XXXA: ICD-10-CM

## 2020-05-02 DIAGNOSIS — M19.90: ICD-10-CM

## 2020-05-02 DIAGNOSIS — E78.00: ICD-10-CM

## 2020-05-02 DIAGNOSIS — I11.0: ICD-10-CM

## 2020-05-02 DIAGNOSIS — I48.91: ICD-10-CM

## 2020-05-02 DIAGNOSIS — I50.9: ICD-10-CM

## 2020-05-02 DIAGNOSIS — Z79.899: ICD-10-CM

## 2020-05-02 DIAGNOSIS — E78.5: ICD-10-CM

## 2020-05-02 DIAGNOSIS — K74.60: ICD-10-CM

## 2020-05-02 DIAGNOSIS — Z79.4: ICD-10-CM

## 2020-05-02 LAB
ALBUMIN SERPL BCG-MCNC: 3.4 G/DL (ref 3.4–4.8)
ALP SERPL-CCNC: 171 U/L (ref 40–110)
ALT SERPL W P-5'-P-CCNC: 76 U/L (ref 8–55)
ANION GAP SERPL CALC-SCNC: 24 MMOL/L (ref 10–20)
APTT PPP: 48.5 SEC (ref 22.9–36.1)
AST SERPL-CCNC: 38 U/L (ref 5–34)
BILIRUB SERPL-MCNC: 0.2 MG/DL (ref 0.2–1.2)
BUN SERPL-MCNC: 22 MG/DL (ref 8.4–25.7)
CA-I BLDV-SCNC: 1.09 MMOL/L
CALCIUM SERPL-MCNC: 8.4 MG/DL (ref 7.8–10.44)
CHLORIDE BLDV-SCNC: 99 MMOL/L (ref 98–107)
CHLORIDE SERPL-SCNC: 101 MMOL/L (ref 98–107)
CO2 BLDV CALC-SCNC: 22.4 MMOL/L (ref 22–28)
CO2 SERPL-SCNC: 18 MMOL/L (ref 23–31)
CREAT CL PREDICTED SERPL C-G-VRATE: 0 ML/MIN (ref 70–130)
GLOBULIN SER CALC-MCNC: 3.3 G/DL (ref 2.4–3.5)
GLUCOSE SERPL-MCNC: 199 MG/DL (ref 83–110)
HCO3 BLDV-SCNC: 20.2 MMOL/L (ref 22–28)
HCT VFR BLD CALC: 39 % (ref 42–52)
HGB BLD CALC-MCNC: 13.3 G/DL (ref 14–18)
HGB BLD-MCNC: 11.3 G/DL (ref 14–18)
INR PPP: 1.6
MCH RBC QN AUTO: 28.1 PG (ref 27–31)
MCV RBC AUTO: 93.8 FL (ref 78–98)
MDIFF COMPLETE?: YES
PCO2 BLDV: 70.3 MMHG (ref 40–50)
PLATELET # BLD AUTO: 143 THOU/UL (ref 130–400)
POTASSIUM BLDV-SCNC: 3.8 MMOL/L (ref 3.5–5.1)
POTASSIUM SERPL-SCNC: 4 MMOL/L (ref 3.5–5.1)
PROTHROMBIN TIME: 18.6 SEC (ref 12–14.7)
RBC # BLD AUTO: 4.01 MILL/UL (ref 4.7–6.1)
SAO2 % BLDV FROM PO2: 98.5 % (ref 60–85)
SODIUM BLDV-SCNC: 136 MMOL/L (ref 138–145)
SODIUM SERPL-SCNC: 139 MMOL/L (ref 136–145)
WBC # BLD AUTO: 14 THOU/UL (ref 4.8–10.8)

## 2020-05-02 PROCEDURE — U0002 COVID-19 LAB TEST NON-CDC: HCPCS

## 2020-05-02 PROCEDURE — 96375 TX/PRO/DX INJ NEW DRUG ADDON: CPT

## 2020-05-02 PROCEDURE — 85610 PROTHROMBIN TIME: CPT

## 2020-05-02 PROCEDURE — 36416 COLLJ CAPILLARY BLOOD SPEC: CPT

## 2020-05-02 PROCEDURE — 51703 INSERT BLADDER CATH COMPLEX: CPT

## 2020-05-02 PROCEDURE — 80053 COMPREHEN METABOLIC PANEL: CPT

## 2020-05-02 PROCEDURE — 85730 THROMBOPLASTIN TIME PARTIAL: CPT

## 2020-05-02 PROCEDURE — 82803 BLOOD GASES ANY COMBINATION: CPT

## 2020-05-02 PROCEDURE — 82330 ASSAY OF CALCIUM: CPT

## 2020-05-02 PROCEDURE — 87635 SARS-COV-2 COVID-19 AMP PRB: CPT

## 2020-05-02 PROCEDURE — 84484 ASSAY OF TROPONIN QUANT: CPT

## 2020-05-02 PROCEDURE — 96374 THER/PROPH/DIAG INJ IV PUSH: CPT

## 2020-05-02 PROCEDURE — 93005 ELECTROCARDIOGRAM TRACING: CPT

## 2020-05-02 PROCEDURE — 85025 COMPLETE CBC W/AUTO DIFF WBC: CPT

## 2020-05-03 ENCOUNTER — HOSPITAL ENCOUNTER (INPATIENT)
Dept: HOSPITAL 92 - ERS | Age: 80
LOS: 8 days | DRG: 637 | End: 2020-05-11
Attending: FAMILY MEDICINE | Admitting: FAMILY MEDICINE
Payer: COMMERCIAL

## 2020-05-03 VITALS — BODY MASS INDEX: 22.8 KG/M2

## 2020-05-03 DIAGNOSIS — R40.2122: ICD-10-CM

## 2020-05-03 DIAGNOSIS — R40.2312: ICD-10-CM

## 2020-05-03 DIAGNOSIS — R32: ICD-10-CM

## 2020-05-03 DIAGNOSIS — Z20.828: ICD-10-CM

## 2020-05-03 DIAGNOSIS — I42.8: ICD-10-CM

## 2020-05-03 DIAGNOSIS — K74.60: ICD-10-CM

## 2020-05-03 DIAGNOSIS — G93.1: ICD-10-CM

## 2020-05-03 DIAGNOSIS — N39.0: ICD-10-CM

## 2020-05-03 DIAGNOSIS — R40.2212: ICD-10-CM

## 2020-05-03 DIAGNOSIS — Z78.1: ICD-10-CM

## 2020-05-03 DIAGNOSIS — T83.518A: ICD-10-CM

## 2020-05-03 DIAGNOSIS — E11.649: Primary | ICD-10-CM

## 2020-05-03 DIAGNOSIS — I34.0: ICD-10-CM

## 2020-05-03 DIAGNOSIS — Z79.4: ICD-10-CM

## 2020-05-03 DIAGNOSIS — D64.89: ICD-10-CM

## 2020-05-03 DIAGNOSIS — F03.90: ICD-10-CM

## 2020-05-03 DIAGNOSIS — I46.8: ICD-10-CM

## 2020-05-03 DIAGNOSIS — M19.90: ICD-10-CM

## 2020-05-03 DIAGNOSIS — I50.43: ICD-10-CM

## 2020-05-03 DIAGNOSIS — Z79.82: ICD-10-CM

## 2020-05-03 DIAGNOSIS — I11.0: ICD-10-CM

## 2020-05-03 DIAGNOSIS — B18.2: ICD-10-CM

## 2020-05-03 DIAGNOSIS — J96.01: ICD-10-CM

## 2020-05-03 DIAGNOSIS — K59.00: ICD-10-CM

## 2020-05-03 DIAGNOSIS — Z51.5: ICD-10-CM

## 2020-05-03 DIAGNOSIS — Z79.899: ICD-10-CM
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
## 2020-05-03 DIAGNOSIS — Z66: ICD-10-CM

## 2020-05-03 LAB
ANALYZER IN CARDIO: (no result)
ANION GAP SERPL CALC-SCNC: 15 MMOL/L (ref 10–20)
ANION GAP SERPL CALC-SCNC: 16 MMOL/L (ref 10–20)
ANION GAP SERPL CALC-SCNC: 16 MMOL/L (ref 10–20)
ANION GAP SERPL CALC-SCNC: 19 MMOL/L (ref 10–20)
APTT PPP: 27.9 SEC (ref 22.9–36.1)
APTT PPP: 31 SEC (ref 22.9–36.1)
APTT PPP: 35.4 SEC (ref 22.9–36.1)
APTT PPP: 36.1 SEC (ref 22.9–36.1)
BASE EXCESS STD BLDA CALC-SCNC: -7.3 MEQ/L
BASOPHILS # BLD AUTO: 0 THOU/UL (ref 0–0.2)
BASOPHILS NFR BLD AUTO: 0 % (ref 0–1)
BASOPHILS NFR BLD AUTO: 0.1 % (ref 0–1)
BASOPHILS NFR BLD AUTO: 0.1 % (ref 0–1)
BASOPHILS NFR BLD AUTO: 0.3 % (ref 0–1)
BUN SERPL-MCNC: 24 MG/DL (ref 8.4–25.7)
BUN SERPL-MCNC: 29 MG/DL (ref 8.4–25.7)
CA-I BLDA-SCNC: 1.06 MMOL/L (ref 1.12–1.3)
CALCIUM SERPL-MCNC: 8.1 MG/DL (ref 7.8–10.44)
CALCIUM SERPL-MCNC: 8.1 MG/DL (ref 7.8–10.44)
CALCIUM SERPL-MCNC: 8.3 MG/DL (ref 7.8–10.44)
CALCIUM SERPL-MCNC: 8.4 MG/DL (ref 7.8–10.44)
CHLORIDE SERPL-SCNC: 105 MMOL/L (ref 98–107)
CHLORIDE SERPL-SCNC: 105 MMOL/L (ref 98–107)
CHLORIDE SERPL-SCNC: 106 MMOL/L (ref 98–107)
CHLORIDE SERPL-SCNC: 106 MMOL/L (ref 98–107)
CK MB SERPL-MCNC: 19.8 NG/ML (ref 0–6.6)
CK MB SERPL-MCNC: 30.6 NG/ML (ref 0–6.6)
CK MB SERPL-MCNC: 33.3 NG/ML (ref 0–6.6)
CK MB SERPL-MCNC: 43.6 NG/ML (ref 0–6.6)
CO2 SERPL-SCNC: 19 MMOL/L (ref 23–31)
CO2 SERPL-SCNC: 19 MMOL/L (ref 23–31)
CO2 SERPL-SCNC: 20 MMOL/L (ref 23–31)
CO2 SERPL-SCNC: 20 MMOL/L (ref 23–31)
COMM CRITICAL RESULTS DOC: (no result)
COMM CRITICAL RESULTS DOC: (no result)
CREAT CL PREDICTED SERPL C-G-VRATE: 79 ML/MIN (ref 70–130)
CREAT CL PREDICTED SERPL C-G-VRATE: 82 ML/MIN (ref 70–130)
CREAT CL PREDICTED SERPL C-G-VRATE: 84 ML/MIN (ref 70–130)
CREAT CL PREDICTED SERPL C-G-VRATE: 86 ML/MIN (ref 70–130)
DRUG SCREEN CUTOFF: (no result)
EOSINOPHIL # BLD AUTO: 0 THOU/UL (ref 0–0.7)
EOSINOPHIL NFR BLD AUTO: 0.1 % (ref 0–10)
EOSINOPHIL NFR BLD AUTO: 0.1 % (ref 0–10)
EOSINOPHIL NFR BLD AUTO: 0.2 % (ref 0–10)
EOSINOPHIL NFR BLD AUTO: 0.2 % (ref 0–10)
GLUCOSE SERPL-MCNC: 142 MG/DL (ref 83–110)
GLUCOSE SERPL-MCNC: 156 MG/DL (ref 83–110)
GLUCOSE SERPL-MCNC: 163 MG/DL (ref 83–110)
GLUCOSE SERPL-MCNC: 165 MG/DL (ref 83–110)
GLUCOSE UR STRIP-MCNC: 300 MG/DL
HCO3 BLDA-SCNC: 20.2 MEQ/L (ref 22–28)
HCT VFR BLDA CALC: 33 % (ref 42–52)
HGB BLD-MCNC: 12.4 G/DL (ref 14–18)
HGB BLD-MCNC: 12.5 G/DL (ref 14–18)
HGB BLD-MCNC: 12.8 G/DL (ref 14–18)
HGB BLD-MCNC: 13 G/DL (ref 14–18)
HGB BLDA-MCNC: 11.3 G/DL (ref 14–18)
INR PPP: 1.3
INR PPP: 1.4
LYMPHOCYTES # BLD: 0.8 THOU/UL (ref 1.2–3.4)
LYMPHOCYTES # BLD: 0.9 THOU/UL (ref 1.2–3.4)
LYMPHOCYTES # BLD: 0.9 THOU/UL (ref 1.2–3.4)
LYMPHOCYTES # BLD: 1 THOU/UL (ref 1.2–3.4)
LYMPHOCYTES NFR BLD AUTO: 4.6 % (ref 21–51)
LYMPHOCYTES NFR BLD AUTO: 4.9 % (ref 21–51)
LYMPHOCYTES NFR BLD AUTO: 5.9 % (ref 21–51)
LYMPHOCYTES NFR BLD AUTO: 6.1 % (ref 21–51)
MAGNESIUM SERPL-MCNC: 1.9 MG/DL (ref 1.6–2.6)
MAGNESIUM SERPL-MCNC: 2 MG/DL (ref 1.6–2.6)
MAGNESIUM SERPL-MCNC: 2 MG/DL (ref 1.6–2.6)
MAGNESIUM SERPL-MCNC: 2.1 MG/DL (ref 1.6–2.6)
MCH RBC QN AUTO: 28.4 PG (ref 27–31)
MCH RBC QN AUTO: 28.4 PG (ref 27–31)
MCH RBC QN AUTO: 28.8 PG (ref 27–31)
MCH RBC QN AUTO: 29 PG (ref 27–31)
MCV RBC AUTO: 91 FL (ref 78–98)
MCV RBC AUTO: 91.7 FL (ref 78–98)
MCV RBC AUTO: 92.4 FL (ref 78–98)
MCV RBC AUTO: 94.4 FL (ref 78–98)
MEDTOX CONTROL LINE VALID?: (no result)
MEDTOX READER #: (no result)
MONOCYTES # BLD AUTO: 1.5 THOU/UL (ref 0.11–0.59)
MONOCYTES # BLD AUTO: 1.6 THOU/UL (ref 0.11–0.59)
MONOCYTES # BLD AUTO: 1.6 THOU/UL (ref 0.11–0.59)
MONOCYTES # BLD AUTO: 1.7 THOU/UL (ref 0.11–0.59)
MONOCYTES NFR BLD AUTO: 10.2 % (ref 0–10)
MONOCYTES NFR BLD AUTO: 9.3 % (ref 0–10)
MONOCYTES NFR BLD AUTO: 9.5 % (ref 0–10)
MONOCYTES NFR BLD AUTO: 9.5 % (ref 0–10)
NEUTROPHILS # BLD AUTO: 13.3 THOU/UL (ref 1.4–6.5)
NEUTROPHILS # BLD AUTO: 13.3 THOU/UL (ref 1.4–6.5)
NEUTROPHILS # BLD AUTO: 14.6 THOU/UL (ref 1.4–6.5)
NEUTROPHILS # BLD AUTO: 16 THOU/UL (ref 1.4–6.5)
NEUTROPHILS NFR BLD AUTO: 83.4 % (ref 42–75)
NEUTROPHILS NFR BLD AUTO: 84.1 % (ref 42–75)
NEUTROPHILS NFR BLD AUTO: 85.5 % (ref 42–75)
NEUTROPHILS NFR BLD AUTO: 85.9 % (ref 42–75)
PCO2 BLDA: 49.4 MMHG (ref 35–45)
PH BLDA: 7.23 [PH] (ref 7.35–7.45)
PLATELET # BLD AUTO: 142 THOU/UL (ref 130–400)
PLATELET # BLD AUTO: 143 THOU/UL (ref 130–400)
PLATELET # BLD AUTO: 153 THOU/UL (ref 130–400)
PLATELET # BLD AUTO: 187 THOU/UL (ref 130–400)
PO2 BLDA: 207.2 MMHG (ref 70–?)
POTASSIUM BLD-SCNC: 3.96 MMOL/L (ref 3.7–5.3)
POTASSIUM SERPL-SCNC: 3.8 MMOL/L (ref 3.5–5.1)
POTASSIUM SERPL-SCNC: 3.9 MMOL/L (ref 3.5–5.1)
POTASSIUM SERPL-SCNC: 4.6 MMOL/L (ref 3.5–5.1)
POTASSIUM SERPL-SCNC: 5.2 MMOL/L (ref 3.5–5.1)
PROT UR STRIP.AUTO-MCNC: 70 MG/DL
PROTHROMBIN TIME: 15.7 SEC (ref 12–14.7)
PROTHROMBIN TIME: 16 SEC (ref 12–14.7)
PROTHROMBIN TIME: 16 SEC (ref 12–14.7)
PROTHROMBIN TIME: 17.3 SEC (ref 12–14.7)
RBC # BLD AUTO: 4.26 MILL/UL (ref 4.7–6.1)
RBC # BLD AUTO: 4.39 MILL/UL (ref 4.7–6.1)
RBC # BLD AUTO: 4.46 MILL/UL (ref 4.7–6.1)
RBC # BLD AUTO: 4.58 MILL/UL (ref 4.7–6.1)
RBC UR QL AUTO: (no result) HPF (ref 0–3)
SODIUM SERPL-SCNC: 135 MMOL/L (ref 136–145)
SODIUM SERPL-SCNC: 136 MMOL/L (ref 136–145)
SODIUM SERPL-SCNC: 137 MMOL/L (ref 136–145)
SODIUM SERPL-SCNC: 140 MMOL/L (ref 136–145)
SPECIMEN DRAWN FROM PATIENT: (no result)
TROPONIN I SERPL DL<=0.01 NG/ML-MCNC: 0.98 NG/ML (ref ?–0.03)
TROPONIN I SERPL DL<=0.01 NG/ML-MCNC: 1.49 NG/ML (ref ?–0.03)
TROPONIN I SERPL DL<=0.01 NG/ML-MCNC: 1.84 NG/ML (ref ?–0.03)
TROPONIN I SERPL DL<=0.01 NG/ML-MCNC: 2.4 NG/ML (ref ?–0.03)
WBC # BLD AUTO: 15.9 THOU/UL (ref 4.8–10.8)
WBC # BLD AUTO: 15.9 THOU/UL (ref 4.8–10.8)
WBC # BLD AUTO: 17 THOU/UL (ref 4.8–10.8)
WBC # BLD AUTO: 18.6 THOU/UL (ref 4.8–10.8)
WBC UR QL AUTO: (no result) HPF (ref 0–3)

## 2020-05-03 PROCEDURE — 83605 ASSAY OF LACTIC ACID: CPT

## 2020-05-03 PROCEDURE — 36556 INSERT NON-TUNNEL CV CATH: CPT

## 2020-05-03 PROCEDURE — 85610 PROTHROMBIN TIME: CPT

## 2020-05-03 PROCEDURE — 85379 FIBRIN DEGRADATION QUANT: CPT

## 2020-05-03 PROCEDURE — 82805 BLOOD GASES W/O2 SATURATION: CPT

## 2020-05-03 PROCEDURE — 87086 URINE CULTURE/COLONY COUNT: CPT

## 2020-05-03 PROCEDURE — 36415 COLL VENOUS BLD VENIPUNCTURE: CPT

## 2020-05-03 PROCEDURE — 80053 COMPREHEN METABOLIC PANEL: CPT

## 2020-05-03 PROCEDURE — 84145 PROCALCITONIN (PCT): CPT

## 2020-05-03 PROCEDURE — 82728 ASSAY OF FERRITIN: CPT

## 2020-05-03 PROCEDURE — 84100 ASSAY OF PHOSPHORUS: CPT

## 2020-05-03 PROCEDURE — 96366 THER/PROPH/DIAG IV INF ADDON: CPT

## 2020-05-03 PROCEDURE — S0028 INJECTION, FAMOTIDINE, 20 MG: HCPCS

## 2020-05-03 PROCEDURE — 3E033XZ INTRODUCTION OF VASOPRESSOR INTO PERIPHERAL VEIN, PERCUTANEOUS APPROACH: ICD-10-PCS | Performed by: FAMILY MEDICINE

## 2020-05-03 PROCEDURE — 93306 TTE W/DOPPLER COMPLETE: CPT

## 2020-05-03 PROCEDURE — 83735 ASSAY OF MAGNESIUM: CPT

## 2020-05-03 PROCEDURE — 85730 THROMBOPLASTIN TIME PARTIAL: CPT

## 2020-05-03 PROCEDURE — 87040 BLOOD CULTURE FOR BACTERIA: CPT

## 2020-05-03 PROCEDURE — 80048 BASIC METABOLIC PNL TOTAL CA: CPT

## 2020-05-03 PROCEDURE — 93005 ELECTROCARDIOGRAM TRACING: CPT

## 2020-05-03 PROCEDURE — 84484 ASSAY OF TROPONIN QUANT: CPT

## 2020-05-03 PROCEDURE — 5A1945Z RESPIRATORY VENTILATION, 24-96 CONSECUTIVE HOURS: ICD-10-PCS | Performed by: FAMILY MEDICINE

## 2020-05-03 PROCEDURE — 85025 COMPLETE CBC W/AUTO DIFF WBC: CPT

## 2020-05-03 PROCEDURE — 8E0ZXY6 ISOLATION: ICD-10-PCS | Performed by: FAMILY MEDICINE

## 2020-05-03 PROCEDURE — 71045 X-RAY EXAM CHEST 1 VIEW: CPT

## 2020-05-03 PROCEDURE — 82553 CREATINE MB FRACTION: CPT

## 2020-05-03 PROCEDURE — 83615 LACTATE (LD) (LDH) ENZYME: CPT

## 2020-05-03 PROCEDURE — 81001 URINALYSIS AUTO W/SCOPE: CPT

## 2020-05-03 PROCEDURE — 36416 COLLJ CAPILLARY BLOOD SPEC: CPT

## 2020-05-03 PROCEDURE — 86140 C-REACTIVE PROTEIN: CPT

## 2020-05-03 PROCEDURE — 87635 SARS-COV-2 COVID-19 AMP PRB: CPT

## 2020-05-03 PROCEDURE — U0003 INFECTIOUS AGENT DETECTION BY NUCLEIC ACID (DNA OR RNA); SEVERE ACUTE RESPIRATORY SYNDROME CORONAVIRUS 2 (SARS-COV-2) (CORONAVIRUS DISEASE [COVID-19]), AMPLIFIED PROBE TECHNIQUE, MAKING USE OF HIGH THROUGHPUT TECHNOLOGIES AS DESCRIBED BY CMS-2020-01-R: HCPCS

## 2020-05-03 PROCEDURE — 94003 VENT MGMT INPAT SUBQ DAY: CPT

## 2020-05-03 PROCEDURE — 80306 DRUG TEST PRSMV INSTRMNT: CPT

## 2020-05-03 PROCEDURE — 96365 THER/PROPH/DIAG IV INF INIT: CPT

## 2020-05-03 RX ADMIN — VANCOMYCIN HYDROCHLORIDE SCH MLS: 1 INJECTION, POWDER, LYOPHILIZED, FOR SOLUTION INTRAVENOUS at 05:34

## 2020-05-03 RX ADMIN — INSULIN LISPRO PRN UNIT: 100 INJECTION, SOLUTION INTRAVENOUS; SUBCUTANEOUS at 11:54

## 2020-05-03 RX ADMIN — VANCOMYCIN HYDROCHLORIDE SCH MLS: 1 INJECTION, POWDER, LYOPHILIZED, FOR SOLUTION INTRAVENOUS at 16:33

## 2020-05-03 RX ADMIN — INSULIN LISPRO PRN UNIT: 100 INJECTION, SOLUTION INTRAVENOUS; SUBCUTANEOUS at 17:41

## 2020-05-03 RX ADMIN — FAMOTIDINE SCH MG: 10 INJECTION, SOLUTION INTRAVENOUS at 07:29

## 2020-05-03 RX ADMIN — FAMOTIDINE SCH MG: 10 INJECTION, SOLUTION INTRAVENOUS at 19:56

## 2020-05-03 NOTE — CON
DATE OF CONSULTATION:  05/03/2020



REASON FOR CONSULTATION:  Cardiac arrest.



HISTORY OF PRESENT ILLNESS:  Mr. Lopez is a 79-year-old  gentleman,

who is an inmate, who had an out-of-hospital cardiac arrest.  He went to the residential

nurse in the [facility].  He was found to have a blood sugar of 416, so he was given

5 units of NPH and 12 units of regular insulin.  Later that day, he was not feeling

well, went back and his sugar was at 30.  They gave him 2 glucose tabs and a repeat

level was 22.  A shot of glucagon was planned, and attempts to get an IV were

fruitless as he became combative and eventually went unconscious.  CPR was started.

EMS was called.  On arrival, he was in asystole.  His blood sugar was back up to 60

at that point.  He was intubated and brought in for further evaluation.  He did have

1 run of ventricular tachycardia during his CPR, but this was not the initial

rhythm.  Currently, on my evaluation, he is sedated and intubated, unable to provide

any history. 



PAST MEDICAL HISTORY:  

1. Nonischemic cardiomyopathy.  At last evaluation, EF was 15% to 20% back in May

2019.  At that time, he had a normal heart catheterization and was sent home on a

LifeVest. 

2. Osteoarthritis.

3. Type 2 diabetes.

4. Urine incontinence.

5. Chronic anemia.

6. Dementia.

7. Hepatitis C with cirrhosis.



PAST SURGICAL HISTORY:  Left inguinal hernia repair.



FAMILY HISTORY:  Noncontributory.



SOCIAL HISTORY:  Per chart review.  No alcohol, tobacco, or drugs.



OUTPATIENT MEDICATIONS:  

1. Aspirin 81 mg a day.

2. Insulin NPH 6 units b.i.d.

3. Regular insulin 100 units.

4. Lisinopril 5 mg a day.

5. Atorvastatin 10 mg at bedtime.

6. Carvedilol 12.5 mg b.i.d.

7. Lasix 40 mg a day.

8. Sofosbuvir with velpatasvir daily.

9. Spironolactone 25 mg b.i.d.



REVIEW OF SYSTEMS:  Unobtainable as the patient is sedated and intubated.



PHYSICAL EXAMINATION:

VITAL SIGNS:  Temperature 93.2, he is being cooled currently, heart rate is 72,

respiratory rate 22, saturating 100% on 40% FiO2 blood, blood pressure currently is

170/85. 

GENERAL:  Sedated, intubated. 

LUNGS:  Clear. 

CARDIOVASCULAR:  S1, S2.  There is a grade 2/6 systolic murmur at the right upper

sternal border. 

ABDOMEN:  Soft.  Positive bowel sounds. 

EXTREMITIES:  No edema. 

SKIN:  Cool and dry.



LABORATORY DATA:  Laboratory work was reviewed.  COVID PCR was negative.  CBC with a

white count of 15, hemoglobin of 12, hematocrit of 39, platelet count of 143.  Coags

were unremarkable.  ABG was reviewed. Chemistries were reviewed.  Troponin was 0.98

and 2.4 with CK-MB of 19, up to 33.  Potassium was 5.2, down to 4.6.  Normal BUN and

creatinine.  GFR was greater than 90.  UA was 2+ blood.  Urine drug screen was

completely normal.  Urine culture is negative at 12 hours. 



IMAGING STUDIES:  Chest x-ray showed left anterior 2nd rib fracture and small amount

of pulmonary edema. 



ASSESSMENT:  

1. Out-of-hospital cardiac arrest.

2. Asystole.

3. Hypoglycemic arrest most likely.

4. Nonischemic cardiomyopathy with an EF of 15% to 20% at last evaluation.



PLAN:  

1. Get an echocardiogram to look at LV function and valvular structures.

2. If LV function remains reduced and he does well mentation wise, he may be a

candidate for an AICD before leaving the hospital.  We will wait for neurologic

recovery before this happens. 

3. Continue outpatient regimen for now for heart failure.  His heart failure

medicines are adequate.  May change his ACE inhibitor for Entresto in the next few

days if he recovers neurologically. 

Thank you for letting us participate in the care of your patient.  We will follow.







Job ID:  [id number]

## 2020-05-03 NOTE — PDOC.FPRHP
- History of Present Illness


Chief Complaint: unrepsonsive


History of Present Illness: 





79YOM with a PMH notable for IDDMII, combined HF, and chronic hep C w/ 

cirrhosis who was brought in by EMS after being found unresponsive at the 

prison earlier yesterday evening. Per the prison nurse, the patient presented 

to the [facility] and was given 5U NPH and 12U regular insulin for hyperglycemia 

with a BG of 416. However, later that day, the patient was reportedly "not 

feeling well" and was found to be hypoglycemic at 30 & was given 2 oral glucose 

tabs. A repeat BG level following the glucose tabs was still low at 22 so the 

nurse gave him a shot of glucagon and tried to start an IV. The patient then 

reportedly became combative and then unconscious so CPR was started & EMS was 

called. Per EMS, on their arrival to the prison, CPR was underway & the patient'

s BG was still low at 60. His initial rhythm was asystole & CPR was continued 

during which time he was also sedated & intubated. A R humeral IO was also 

placed & en route to the [place] ER he was shocked at 200J for Vtach & given epi 

x5, 80mg bicarb, 1 bag D10, & 200 ketamine for sedation. He was then given 

another 20mcg of epi IVP. In the [place] ER he was given 80mg of Brayden and 4mg of 

Versed & before being transferred to [facility] for further management.  


ED Course: 





In the [facility] ER the patient was started on a levophed drip through his R 

humeral IO. Central line placement was attempted x4 & was unsuccessful. 





- Allergies/Adverse Reactions


 Allergies











Allergy/AdvReac Type Severity Reaction Status Date / Time


 


No Known Allergies Allergy   Verified 05/08/19 21:44














- Home Medications


 











 Medication  Instructions  Recorded  Confirmed  Type


 


Acetaminophen [Tylenol Suppository] 325 mg RI Q4H PRN  supp 05/09/20  Rx


 


Bisacodyl [Dulcolax] 10 mg RI DAILYPRN PRN  supp 05/09/20  Rx


 


Lorazepam [Ativan] 1 mg SLOW IVP Q1H PRN  vial 05/09/20  Rx


 


Morphine 4 mg SLOW IVP Q2H PRN  vial 05/09/20  Rx


 


Polyethylene Glycol 3350 [Miralax] 17 gm PER TUBE DAILY  pk 05/09/20  Rx


 


Scopolamine [Transderm Scop] 1.5 mg TD Q3D PRN  patch 05/09/20  Rx


 


Sennosides/Docusate Sodium 1 tab PO BID  tab 05/09/20  Rx





[Senokot S]    














- History


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 

nonischemic CM (EF 15-20% per May of 2019 ECHO), osteoarthritis, IDDM type 2, 

urinary incontinence 





 PSHx: Left inguinal hernia repair (May of 2019)   





FHx: unknown


 


Social: unknown


 








- Review of Systems


ROS unobtainable: due to endotracheal tube





- Vital signs


BP: 159/111  HR: 94 RR: 22 Tmax: 91.4F Pox: 100% on SIMV mode (, 40% FiO2

, PEEP 8, Ps 10)  Wt: 74 kg   








- Physical Exam


Constitutional: NAD, other (intubated)


HEENT: normocephalic and atraumatic, other (NG & OT tubes in place)


Heart: RRR, normal S1/S2


Lungs: CTAB, no respiratory distress, good air movement, no rales/rhonchi, no 

wheezing, other


Abdomen: soft, bowel sounds present, other (mildly distended)


Musculoskeletal: normal structure


Neurological: other (unable to fully assess 2/2 intubation; gag reflex intact)


Skin: no rash/lesions, good turgor


Psychiatric: other (unable to fully assess 2/2 intubation)





FMR H&P: Results





- Labs


Result Diagrams: 


 05/06/20 05:35





 05/06/20 05:35


Lab results: 


 











ABG pH  7.23  (7.35-7.45)  L*  05/03/20  01:02    


 


ABG pCO2  49.4 mmHg (35.0-45.0)  H  05/03/20  01:02    


 


ABG pO2  207.2 mmHg (> 70.0)  H  05/03/20  01:02    








[facility] Labs:


WBC 14


Hgb 11.3


Hct 37.6


Plts 143


3 bands


54 lymphocytes





Na 139


K 4.0


Cl 101


HCO3 18


BUN 22


Cr 0.93








- EKG Interpretation


EKG: 





NSR @ 65 bmp w/ prolonged QT of 536





- Radiology Interpretation


  ** Chest x-ray


Status: image reviewed by me (multifocal, non-peripheral airspace opacities vs. 

LAD)





FMR H&P: A/P





- Problem List


(1) Cardiopulmonary arrest with successful resuscitation


Status: Acute   Code(s): I46.9 - CARDIAC ARREST, CAUSE UNSPECIFIED   





(2) Person under investigation for COVID-19


Status: Acute   Code(s): Z20.828 - CONTACT W AND EXPOSURE TO OTH VIRAL 

COMMUNICABLE DISEASES   





(3) Hepatitis C


Status: Chronic   Code(s): B19.20 - UNSPECIFIED VIRAL HEPATITIS C WITHOUT 

HEPATIC COMA   





(4) Cirrhosis


Status: Chronic   Code(s): K74.60 - UNSPECIFIED CIRRHOSIS OF LIVER   





(5) Dementia


Status: Chronic   Code(s): F03.90 - UNSPECIFIED DEMENTIA WITHOUT BEHAVIORAL 

DISTURBANCE   





(6) Anemia


Status: Chronic   Code(s): D64.9 - ANEMIA, UNSPECIFIED   





(7) Heart failure


Status: Chronic   Code(s): I50.9 - HEART FAILURE, UNSPECIFIED   


Qualifiers: 


   Heart failure type: unspecified 





(8) Urinary incontinence


Status: Chronic   Code(s): R32 - UNSPECIFIED URINARY INCONTINENCE   





(9) Diabetes mellitus, type II


Status: Chronic   


Qualifiers: 


   Diabetes mellitus long term insulin use: unspecified long term insulin use 

status 





- Plan





79YOM with a PMH notable for DMII, CHF & HTN who was brought in via EMS from 

prison after being found down & is s/p cardiopulmonary arrest w/ ROSC.





# Cardiopulmonary arrest s/p ROSC:


- Patient found down at the prison after reportedly being found to be severely 

hypoglycemic & was intubated at prison by EMS. Source of hypoglycemia unknown 

as patient was reportedly hyperglycemic earlier in the day yesterday.


- Currently stable on Levophed drip and ventilatory support with mechanical 

ventilation.


- Etiology for arrest unknown at this point. Will start BS abx with vanc & 

zosyn. COVID-19 & blood & urine cultures pending. UDS also pending. 


- Will continue targeted temperature management for ~24 hours s/p ROSC (until ~

2313 on 5/3). Goal to keep temp between 32-36C (89.6-96.8F). 


- Will touch base with prison tomorrow to get contact information for next of 

kin to establish code status. 





#IDDMII:


- Aware, will have SSI available to main BG levels between 140-180 while 

inpatient.





#Combined HF (EF 15-20% per May 2019 ECHO):


- Aware. Will give IVFs cautiously & get QD weights & strict I&Os. 


- Will resume home meds as appropriate based on clinical course.





#Cirrhosis w/ h/o Hep C:


- Aware, will resume home meds as tolerated & pending clinical course. 





#Chronic anemia:


- H/H not in transfusion range based on [facility] labs. Will continue to trend. 





#OA:


- Aware. 





#Urinary incontinence:


- Copeland in place. 





#Dementia 


- Aware. 





Dispo: Will admit to CCU for continued supportive care as needed. To establish 

code status with next of kin later this morning once [facility] is available.


Abx: Vanc & zosyn (5/3, day #1)


IVFs: LR @ 120mL/hr


GI PPX: Famotidine


VTE PPX: Lovenox


CODE STATUS: FULL CODE


PCP: CC/Prison





FMR H&P: Upper Level





- Plan


Date/Time: 05/03/20 0205








I, [], have evaluated this patient and agree with findings/plan as outlined by 

intern resident. Pertinent changes/additions are listed here.








Addendum - Attending





- Attending Attestation


Date/Time: 05/12/20 0802





I personally evaluated the patient and discussed the management with Dr. Godinez 

at time of admission May 3rd. 


I agree with the History, Examination, Assessment and Plan documented above 

with any addition or exceptions noted below.

## 2020-05-03 NOTE — RAD
CHEST 1 VIEW:

 

Date:  05/03/2020

 

HISTORY:  

Return of spontaneous circulation. 

 

COMPARISON:  

Radiograph from 2019. 

 

FINDINGS:

There is an intraosseous catheter to right humeral head. Defibrillator pad projects over the right ch
est. Endotracheal tube tip at level of clavicles. Heart size mildly enlarged. 

 

Low grade pulmonary edema. Possible fracture of the left anterior second rib. 

 

IMPRESSION: 

1.  Endotracheal tube tip in good position. 

2.  Possible minimally displaced left anterior second rib fracture. 

3.  Low grade edema. 

 

 

POS: HOME

## 2020-05-03 NOTE — PDOC.BPN
- Brief Progress Note





Date/Time: 05/03/20 5716





I personally evaluated the patient and discussed the management with Dr. Godinez 

and Judit following patient's arrival to CCU. H&P still pending. 


I agree with the History, Examination, Assessment and Plan as discussed.


Unclear hx form group home. Treated for hypoglycemia and cardiac arrest at group home.  

ROSC achieved.  


Absence of spontaneous breaths without sedation suggests severe hypoperfusion 

injury to the brain.


Cooling protocol ongoing.


IO IV access currently.  Attempts made in ER unsuccessful.  Will defer to 

Pulmonology or General Surgery for central line placement. 


Patient is Covid PUI.


Complications include known DM, CHF, Hep C with cirrhosis.


Poor prognosis. Currently unable to discuss clincal state with family as 

contact info is not known.  Only the  has this info and he is not 

available.

## 2020-05-03 NOTE — CON
DATE OF CONSULTATION:  05/03/2020



HISTORY OF PRESENT ILLNESS:  Mr. Lopez is a 79-year-old.  Apparently he has no

family.  He is mechanically ventilated.  He is a [facility] inmate.  He has an

out-of-hospital DNR document with his chart.  Apparently, no one at the [facility] knows

who signed it. 



Apparently he got hypoglycemic after an insulin injection which progressed to an

unconscious state and asystole.  Ventricular tachycardia was noted during CPR, but

that was not what led to the code according to the records. 



PAST MEDICAL HISTORY:  

1. Remarkable for an ejection fraction of 15-20% last year with normal coronaries.

He went out with a LifeVest. 

2. History of diabetes.

3. Osteoarthritis.

4. History of anemia.

5. History of dementia.

6. History of cirrhosis.

7. History of hepatitis C.

8. History of herniorrhaphy.



SOCIAL HISTORY:  He is currently not a drinker or smoker.



FAMILY HISTORY:  Unknown.



REVIEW OF SYSTEMS:  Not obtainable.



PHYSICAL EXAMINATION:

He has been taken off the cooling protocol.  The time period between his arrest and

the initiation of cooling will make this ineffective.  His blood pressure 120/67,

heart rate 72, respiratory rate is per mechanical ventilation. 

EYES:  Pupils sluggish. 

NECK:  Supple. 

LUNGS:  Distant clear. 

HEART:  Regular rhythm. 

ABDOMEN:  Soft. 

EXTREMITIES:  Without asymmetry.



LABORATORY DATA:  White count 17.0, hemoglobin 12.8, platelets 142.  Electrolytes

are normal except for bicarb of 20.  Troponin is 2.4, which is not surprising after

an arrest.  PH at 1 o'clock this morning was 7.23, CO2 49, PO2 207. 



IMPRESSION:  Status post respiratory arrest related to hypoglycemia, which led to a

cardiac arrest. 

Given that he had a pre-existing do-not-resuscitate status, we will have to proceed

forward once due diligence is complete. 



Apparently, the [facility] is trying to find out who signed the do-not-resuscitate

order. 



It is also unclear whether or not he will recover from this neurologically given the

prolonged period of CPR. 



CRITICAL CARE TIME:  30 minutes.







Job ID:  [id number]

## 2020-05-04 LAB
ALBUMIN SERPL BCG-MCNC: 3.1 G/DL (ref 3.4–4.8)
ALP SERPL-CCNC: 127 U/L (ref 40–110)
ALT SERPL W P-5'-P-CCNC: 69 U/L (ref 8–55)
ANALYZER IN CARDIO: (no result)
ANION GAP SERPL CALC-SCNC: 17 MMOL/L (ref 10–20)
AST SERPL-CCNC: 56 U/L (ref 5–34)
BASE EXCESS STD BLDA CALC-SCNC: -2.6 MEQ/L
BILIRUB SERPL-MCNC: 0.7 MG/DL (ref 0.2–1.2)
BUN SERPL-MCNC: 28 MG/DL (ref 8.4–25.7)
CA-I BLDA-SCNC: 1.06 MMOL/L (ref 1.12–1.3)
CALCIUM SERPL-MCNC: 8 MG/DL (ref 7.8–10.44)
CHLORIDE SERPL-SCNC: 107 MMOL/L (ref 98–107)
CO2 SERPL-SCNC: 20 MMOL/L (ref 23–31)
CREAT CL PREDICTED SERPL C-G-VRATE: 72 ML/MIN (ref 70–130)
GLOBULIN SER CALC-MCNC: 3.2 G/DL (ref 2.4–3.5)
GLUCOSE SERPL-MCNC: 174 MG/DL (ref 83–110)
HCO3 BLDA-SCNC: 19.9 MEQ/L (ref 22–28)
HCT VFR BLDA CALC: 34 % (ref 42–52)
HGB BLD-MCNC: 11.9 G/DL (ref 14–18)
HGB BLDA-MCNC: 11.7 G/DL (ref 14–18)
MCH RBC QN AUTO: 27.9 PG (ref 27–31)
MCV RBC AUTO: 91.2 FL (ref 78–98)
MDIFF COMPLETE?: YES
O2 A-A PPRESDIFF RESPIRATORY: 120.28 MM[HG] (ref 0–20)
PCO2 BLDA: 28.1 MMHG (ref 35–45)
PH BLDA: 7.47 [PH] (ref 7.35–7.45)
PLATELET # BLD AUTO: 149 THOU/UL (ref 130–400)
PO2 BLDA: 129.8 MMHG (ref 70–?)
POTASSIUM BLD-SCNC: 3.78 MMOL/L (ref 3.7–5.3)
POTASSIUM SERPL-SCNC: 4.1 MMOL/L (ref 3.5–5.1)
RBC # BLD AUTO: 4.27 MILL/UL (ref 4.7–6.1)
SODIUM SERPL-SCNC: 140 MMOL/L (ref 136–145)
SPECIMEN DRAWN FROM PATIENT: (no result)
WBC # BLD AUTO: 19.5 THOU/UL (ref 4.8–10.8)

## 2020-05-04 RX ADMIN — FAMOTIDINE SCH MG: 10 INJECTION, SOLUTION INTRAVENOUS at 20:46

## 2020-05-04 RX ADMIN — INSULIN LISPRO PRN UNIT: 100 INJECTION, SOLUTION INTRAVENOUS; SUBCUTANEOUS at 18:14

## 2020-05-04 RX ADMIN — FAMOTIDINE SCH MG: 10 INJECTION, SOLUTION INTRAVENOUS at 08:02

## 2020-05-04 RX ADMIN — INSULIN LISPRO PRN UNIT: 100 INJECTION, SOLUTION INTRAVENOUS; SUBCUTANEOUS at 12:36

## 2020-05-04 NOTE — PRG
DATE OF SERVICE:  05/04/2020



SUBJECTIVE:  Matheus Lopez does not reliably follow commands.



OBJECTIVE:  VITAL SIGNS:  FiO2 is at 30%, heart rate is 108, blood pressure 145/
73,

respiratory rate is per mechanical ventilation. 

LUNGS:  Remarkable for mild rhonchi. 

HEART:  Regular rhythm. 

ABDOMEN:  Soft. 

EXTREMITIES:  Without edema.



LABORATORY DATA:  White counts 19.5, hemoglobin 11.9, platelets 149,000.  Sodium

140, potassium 4.1, chloride 107, bicarb 20, BUN 28, creatinine 0.89.  PH 7.47, 
CO2

of 28, pO2 of 129. 



IMPRESSION AND PLAN:  Respiratory failure associated with an out-of-hospital 
arrest. 



His prognosis is dismal.  The warden was contacted by the nursing staff.  He 
has no

family.  He was do not resuscitate at the [facility].  The warden says the 
doctors if

they all agree, can withdrawal care when they feel timing is appropriate.  I 
think

he will have had adequate mechanical ventilation, and tomorrow, we may be able 
to

consider extubation and comfort care moving forward.  Check chest x-ray in the

morning prior to extubation.  Prognosis is dismal for any type of functional

recovery in my opinion. 



Critical care time 30 min.



Job ID:  [id number]



MTDD

## 2020-05-04 NOTE — PDOC.FM
- Subjective


Subjective: 


NAEO. Patient ventilated/sedated. Guards present at the bedside. No concerns 

per nursing. 





- Objective


MAR Reviewed: Yes


Vital Signs & Weight: 


 Vital Signs (12 hours)











  Temp Pulse Resp BP Pulse Ox


 


 05/04/20 06:00    22 H  


 


 05/04/20 04:00  98.9 F   22 H  


 


 05/04/20 02:14   90   128/66 


 


 05/04/20 02:00    22 H  


 


 05/04/20 00:00  99.2 F   22 H  


 


 05/03/20 22:21   85   135/68 


 


 05/03/20 22:00    22 H  


 


 05/03/20 20:00  97.9 F   22 H   100








 Weight











Weight                         75.6 kg











 Most Recent Monitor Data











Heart Rate from ECG            103


 


NIBP                           140/71


 


NIBP BP-Mean                   94


 


Respiration from ECG           17


 


SpO2                           100














I&O: 


 











 05/02/20 05/03/20 05/04/20





 06:59 06:59 06:59


 


Intake Total  100 3785.5


 


Output Total  500 2120


 


Balance  -400 1665.5











Result Diagrams: 


 05/04/20 03:43





 05/04/20 03:43





Phys Exam





- Physical Examination


Constitutional: NAD


HEENT: moist MMs


Neck: full ROM


Respiratory: clear to auscultation bilateral


Cardiovascular: RRR


Gastrointestinal: non-tender, no distention


mildly tense abdomen, more on R than L


Neurological: non-focal


open eyes to pain, withdrawls to pain, no purposeful movements


Skin: no rash, normal turgor, cap refill <2 seconds





Dx/Plan


(1) Cardiopulmonary arrest with successful resuscitation


Code(s): I46.9 - CARDIAC ARREST, CAUSE UNSPECIFIED   Status: Acute   





(2) Person under investigation for COVID-19


Code(s): Z20.828 - CONTACT W AND EXPOSURE TO OTH VIRAL COMMUNICABLE DISEASES   

Status: Acute   





(3) Anemia


Code(s): D64.9 - ANEMIA, UNSPECIFIED   Status: Chronic   





(4) Cirrhosis


Code(s): K74.60 - UNSPECIFIED CIRRHOSIS OF LIVER   Status: Chronic   





(5) Dementia


Code(s): F03.90 - UNSPECIFIED DEMENTIA WITHOUT BEHAVIORAL DISTURBANCE   Status: 

Chronic   





(6) Diabetes mellitus, type II


Status: Chronic   


Qualifiers: 


   Diabetes mellitus long term insulin use: unspecified long term insulin use 

status 





(7) Heart failure


Code(s): I50.9 - HEART FAILURE, UNSPECIFIED   Status: Chronic   


Qualifiers: 


   Heart failure type: unspecified 





(8) Hepatitis C


Code(s): B19.20 - UNSPECIFIED VIRAL HEPATITIS C WITHOUT HEPATIC COMA   Status: 

Chronic   





(9) Urinary incontinence


Code(s): R32 - UNSPECIFIED URINARY INCONTINENCE   Status: Chronic   





(10) CHF (congestive heart failure)


Code(s): I50.9 - HEART FAILURE, UNSPECIFIED   Status: Acute   





- Plan


Plan: 


79YOM with a PMH notable for DMII, CHF & HTN who [facility] from 

[facility] after being found down & is s/p cardiopulmonary arrest w/ ROSC.





Cardiopulmonary arrest s/p ROSC


Patient found down at the [facility] after reportedly being found to be severely 

hypoglycemic & was intubated at [facility] by EMS. Source of hypoglycemia unknown 

as patient was reportedly hyperglycemic earlier in the day 5/2.


- Pulm consulted, appreciate recs


- Continue ventilatory support with mechanical ventilation. Off of pressors. 

Plan to possibly extubate tomorrow morning.


- Etiology for arrest unknown at this point. Will start BS abx with vanc & 

zosyn. Blood & urine cultures NGTD. UDS negative. 


- Cooling protocol completed for ~24 hours s/p ROSC (until ~2313 on 5/3). Goal 

to keep temp between 32-36C (89.6-96.8F). 


- Palliative care consulted due to code status issue. Patient had OOH DNR, but 

was not valid as not signed by 2 physicians.  at the hospital states no 

family or next kin. Patient status to be decided by 2 physicians here per the 

[facility] - DNR order put in by Jesse Bautista and Betty. 





IDDMII


- Aware, will have SSI available to main BG levels between 140-180 while 

inpatient.





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


- Aware. Will give IVFs cautiously & get QD weights & strict I&Os. I/O balance: 

1665ml


- Will resume home meds as appropriate based on clinical course.





Cirrhosis w/ h/o Hep C


- Aware, will resume home meds as tolerated & pending clinical course. 





Chronic anemia


- H/H not in transfusion range based on [facility] labs. Will continue to trend. 





OA


- Aware. 





Urinary incontinence


- Copeland in place. 





Dementia 


- Aware. 





COVID NEGATIVE





Dispo: pending clinical course, continue to monitor in CCU


Abx: Vanc & zosyn (5/4, day #3)


IVFs: LR @ 120mL/hr


GI PPX: Famotidine


VTE PPX: Lovenox


CODE STATUS: FULL CODE


PCP: CC/[facility]PC





Case discussed with Dr. Hernández








Addendum - Attending





- Attending Attestation


Date/Time: 05/04/20 7016





I personally evaluated the patient and discussed the management with Dr. Gregorio


I agree with the History, Examination, Assessment and Plan documented above 

with any addition or exceptions noted below - Patient intubated. Not following 

commands. Afebrile VSS. A/P: 1) Cardiac arrest s/p ROSC - off cooling protocol. 

No further arrhythmias. 2) Non-ischemic cardiomyopathy- stable. Appreciate 

cardiology recommendations. 3) Hypoglycemia - resolved. 4) Hep C with cirrhosis

- stable


Poor prognosis with recent cardiac arrest, poor EF and cirrhosis. Plan to wean 

off vent and extubate. Comfort measures and DNAR.

## 2020-05-04 NOTE — PDOC.CPN
- Subjective


Date: 05/04/20


Time: 14:05


Interval history: 





Remains intubated. Not following commands. 





- Review of Systems


ROS unobtainable: due to endotracheal tube





- Objective


Allergies/Adverse Reactions: 


 Allergies











Allergy/AdvReac Type Severity Reaction Status Date / Time


 


No Known Allergies Allergy   Verified 05/08/19 21:44











Visit Medications: 


 Current Medications





Dextrose/Water (Dextrose 50%)  25 gm SLOW IVP PRN PRN


   PRN Reason: Hypoglycemia


Enoxaparin Sodium (Lovenox)  40 mg SC 0900 EDNA


   Last Admin: 05/04/20 08:02 Dose:  40 mg


Famotidine (Pepcid)  20 mg SLOW IVP Q12HR EDNA


   Last Admin: 05/04/20 08:02 Dose:  20 mg


Glucagon (Glucagon)  1 mg IM PRN PRN


   PRN Reason: Hypoglycemia


Dextrose/Water (D5w)  1,000 mls @ 0 mls/hr IV .Q0M PRN


   PRN Reason: Hypoglycemia


Potassium Chloride 40 meq/ (Sodium Chloride)  270 mls @ 135 mls/hr IVPB ASDIR 

PRN


   PRN Reason: FOR SERUM K+ 2.5 - 3.5


Potassium Chloride 40 meq/ (Device)  100 mls @ 50 mls/hr IVPB ASDIR PRN


   PRN Reason: FOR SERUM K+ 2.5 - 3.5


Magnesium Sulfate 1 gm/ Sodium (Chloride)  102 mls @ 102 mls/hr IV PRN PRN


   PRN Reason: MAG LEVEL 1.4 - 2.0


   Last Admin: 05/03/20 12:02 Dose:  102 mls


Magnesium Sulfate 2 gm/ Device  50 mls @ 50 mls/hr IVPB ASDIR PRN


   PRN Reason: MAGNESIUM < 1.4


Potassium Phosphate 9 mmol/ (Sodium Chloride)  103 mls @ 25.75 mls/hr IVPB 

ASDIR PRN


   PRN Reason: Phosphate 1.0-1.8


Potassium Phosphate 12 mmol/ (Sodium Chloride)  254 mls @ 63.5 mls/hr IV ASDIR 

PRN


   PRN Reason: Serum phosphate 0.5-0.9


Potassium Phosphate 15 mmol/ (Sodium Chloride)  255 mls @ 63.75 mls/hr IV ASDIR 

PRN


   PRN Reason: Serum Phos < 0.5


Piperacillin Sod/Tazobactam (Sod 3.375 gm/ Sodium Chloride)  100 mls @ 200 mls/

hr IVPB Q6HR [facility]


   Last Admin: 05/04/20 12:36 Dose:  100 mls


Fentanyl Citrate 2,000 mcg/ (Sodium Chloride)  100 mls @ 0 mls/hr IV INF [facility]; 

Protocol


   Stop: 06/02/20 05:23


Fentanyl Citrate (Fentanyl Bolus)  250 mls @ 0 mls/hr IVPB PRN PRN


   PRN Reason: Breakthrough pain/agitation


   Stop: 06/02/20 05:23


Lactated Ringer's (Lactated Ringer's)  1,000 mls @ 75 mls/hr IV .[id number] [facility]


   Last Admin: 05/04/20 08:01 Dose:  Not Given


Insulin Human Lispro (Humalog)  0 units SC .MILD SLIDING SCALE PRN


   PRN Reason: Mild Correctional Scale


   Last Admin: 05/04/20 12:36 Dose:  3 unit


Insulin Human Lispro (Humalog)  0 units SC .BEDTIME SLIDING SC PRN


   PRN Reason: Bedtime Correctional Scale


Labetalol HCl (Normodyne)  20 mg SLOW IVP NOW [facility]


   Stop: 05/04/20 15:30


   Last Admin: 05/04/20 13:41 Dose:  20 mg


Lorazepam (Ativan)  2 mg SLOW IVP Q1H PRN


   PRN Reason: Breakthrough agitation


   Stop: 06/02/20 05:23


   Last Admin: 05/03/20 17:18 Dose:  2 mg


Magnesium Oxide (Magnesium Oxide)  400 mg PO BIDPRN PRN


   PRN Reason: FOR SERUM MAG 1.4 - 2.0


Magnesium Oxide (Magnesium Oxide)  800 mg PO PRN PRN


   PRN Reason: FOR SERUM MAG < 1.4


Miscellaneous Medication (Ccu Electrolyte Replacement)  1 each FS ONE [facility]


   Stop: 06/02/20 04:37


Miscellaneous Medication (Phos-Nak)  1 pkt PO TIDPRN PRN


   PRN Reason: FOR PHOS LEVEL 1.0 - 1.8


Miscellaneous Medication (Phos-Nak)  2 pkt PO TIDPRN PRN


   PRN Reason: FOR PHOS LEVEL 0.5 - 1.0


Miscellaneous Medication (Ventilator Sedation Protocol)  1 each FS ONE [facility]


   Stop: 06/02/20 05:14


Morphine Sulfate (Morphine)  2 mg SLOW IVP Q1H PRN


   PRN Reason: BREAKTHROUGH PAIN/Agitation


   Stop: 06/02/20 05:23


   Last Admin: 05/03/20 20:33 Dose:  2 mg


Ccu Electrolyte (Replacement Protocol)  0 each FS PRN PRN


   PRN Reason: FOR ELECTROLYTE REPLACEMENT


Discontinue Previous Narcotic Pain Medications And Benzodiazepines  1 each FS 

.ONE EDNA


   Stop: 06/02/20 05:23


Potassium Chloride (K-Dur)  40 meq PO ASDIR PRN


   PRN Reason: FOR SERUM K+  2.5 - 3.5


Potassium Chloride (Klor-Con)  40 meq PER TUBE ASDIR PRN


   PRN Reason: FOR SERUM K+ 2.5-3.5


Propofol (Diprivan)  1,000 mg IV INF PRN; Protocol


   PRN Reason: TO ACHIEVE GOAL RASS


   Stop: 06/02/20 05:23


   Last Admin: 05/04/20 12:36 Dose:  1,000 mg


Propofol (Diprivan Bolus)  20 mg IV Q5MIN PRN


   PRN Reason: BREAKTHROUGH AGITATION


   Stop: 06/02/20 05:23


Sodium Chloride (Flush - Normal Saline)  10 ml IVF PRN PRN


   PRN Reason: Saline Flush


Vecuronium Bromide (Norcuron)  10 mg IV Q30MIN PRN


   PRN Reason: .


   Last Admin: 05/03/20 17:18 Dose:  10 mg








Vital Signs & Weight: 


 Vital Signs











  Temp Pulse Resp BP Pulse Ox


 


 05/04/20 13:51   80   123/73 


 


 05/04/20 13:41   124 H   104/67 


 


 05/04/20 12:00  99.1 F   16  


 


 05/04/20 11:00   96   


 


 05/04/20 10:00    16  


 


 05/04/20 08:00    22 H   100


 


 05/04/20 07:26   113 H   


 


 05/04/20 07:00  98.8 F    


 


 05/04/20 06:00    22 H  


 


 05/04/20 04:00  98.9 F   22 H  


 


 05/04/20 02:14   90   128/66 








 











Admit Weight                   167 lb 8.8 oz


 


Weight                         166 lb 10.711 oz

















- Physical Exam


General: other (S/I)


HEENT: normocephaly


Neck: supple neck


Cardiac: irregularly regular


Lungs: clear to auscultation


Neuro: other (Unresponsive.)


Abdomen: active bowel sounds


Extremities: no edema


Skin: clear


Musculoskeletal: no pain





- Labs


Result Diagrams: 


 05/04/20 03:43





 05/04/20 03:43


 Troponin/CKMB











CK-MB (CK-2)  30.6 ng/mL (0-6.6)  H*  05/03/20  22:50    


 


Troponin I  1.488 ng/mL (< 0.028)  H*  05/03/20  22:50    














- Telemetry


Sinus rhythms and dysrhythmias: sinus rhythm





- Assessment/Plan


Assessment/Plan: 





1. Out of hospital cardiac arrest. 


2. Asystole was initial rhythm.


3. Hypoglycemia, likely trigger


4. 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


5. Anoxic brain injury.





PLAN:


- Continue supportive care.


- If neurological recovery then will consider other modalities of CHF therapies.


- Poor long term prognosis. 


- Will follow.

## 2020-05-05 LAB
ALBUMIN SERPL BCG-MCNC: 2.9 G/DL (ref 3.4–4.8)
ALP SERPL-CCNC: 105 U/L (ref 40–110)
ALT SERPL W P-5'-P-CCNC: 53 U/L (ref 8–55)
ANION GAP SERPL CALC-SCNC: 14 MMOL/L (ref 10–20)
AST SERPL-CCNC: 37 U/L (ref 5–34)
BASOPHILS # BLD AUTO: 0 THOU/UL (ref 0–0.2)
BASOPHILS NFR BLD AUTO: 0.3 % (ref 0–1)
BILIRUB SERPL-MCNC: 0.8 MG/DL (ref 0.2–1.2)
BUN SERPL-MCNC: 22 MG/DL (ref 8.4–25.7)
CALCIUM SERPL-MCNC: 8.1 MG/DL (ref 7.8–10.44)
CHLORIDE SERPL-SCNC: 109 MMOL/L (ref 98–107)
CO2 SERPL-SCNC: 22 MMOL/L (ref 23–31)
CREAT CL PREDICTED SERPL C-G-VRATE: 75 ML/MIN (ref 70–130)
EOSINOPHIL # BLD AUTO: 0.3 THOU/UL (ref 0–0.7)
EOSINOPHIL NFR BLD AUTO: 1.9 % (ref 0–10)
GLOBULIN SER CALC-MCNC: 3 G/DL (ref 2.4–3.5)
GLUCOSE SERPL-MCNC: 133 MG/DL (ref 83–110)
HGB BLD-MCNC: 10.5 G/DL (ref 14–18)
LYMPHOCYTES # BLD: 1.4 THOU/UL (ref 1.2–3.4)
LYMPHOCYTES NFR BLD AUTO: 9.8 % (ref 21–51)
MCH RBC QN AUTO: 28 PG (ref 27–31)
MCV RBC AUTO: 91.2 FL (ref 78–98)
MONOCYTES # BLD AUTO: 1.8 THOU/UL (ref 0.11–0.59)
MONOCYTES NFR BLD AUTO: 12.1 % (ref 0–10)
NEUTROPHILS # BLD AUTO: 11.1 THOU/UL (ref 1.4–6.5)
NEUTROPHILS NFR BLD AUTO: 75.9 % (ref 42–75)
PLATELET # BLD AUTO: 142 THOU/UL (ref 130–400)
POTASSIUM SERPL-SCNC: 3.6 MMOL/L (ref 3.5–5.1)
RBC # BLD AUTO: 3.76 MILL/UL (ref 4.7–6.1)
SODIUM SERPL-SCNC: 141 MMOL/L (ref 136–145)
WBC # BLD AUTO: 14.7 THOU/UL (ref 4.8–10.8)

## 2020-05-05 RX ADMIN — FAMOTIDINE SCH MG: 10 INJECTION, SOLUTION INTRAVENOUS at 08:42

## 2020-05-05 RX ADMIN — SCOPALAMINE PRN MG: 1 PATCH, EXTENDED RELEASE TRANSDERMAL at 17:46

## 2020-05-05 RX ADMIN — FAMOTIDINE SCH MG: 10 INJECTION, SOLUTION INTRAVENOUS at 20:41

## 2020-05-05 RX ADMIN — INSULIN LISPRO PRN UNIT: 100 INJECTION, SOLUTION INTRAVENOUS; SUBCUTANEOUS at 10:51

## 2020-05-05 NOTE — PDOC.FM
- Subjective


Subjective: 


Able to open eyes, squeeze hands, move feet. Denies pain. Currently intubated. 

Abdomen distended and tympanic. No BM since admission. 








- Objective


MAR Reviewed: Yes


Vital Signs & Weight: 


 Vital Signs (12 hours)











  Temp Pulse Resp BP Pulse Ox


 


 05/05/20 06:00    17  


 


 05/05/20 04:00  98.9 F   16  


 


 05/05/20 03:28   100   139/71 


 


 05/05/20 02:00    21 H  


 


 05/05/20 01:37   95   148/82 H 


 


 05/05/20 00:00  99.1 F   17  


 


 05/04/20 22:00    17  


 


 05/04/20 21:15   100   135/73 


 


 05/04/20 20:00  99.1 F   17   100








 Weight











Admit Weight                   75.999 kg


 


Weight                         76.5 kg











 Most Recent Monitor Data











Heart Rate from ECG            102


 


NIBP                           144/81


 


NIBP BP-Mean                   102


 


Respiration from ECG           17


 


SpO2                           100














I&O: 


 











 05/04/20 05/05/20 05/06/20





 06:59 06:59 06:59


 


Intake Total 3785.5 2314 


 


Output Total 2120 1212 


 


Balance 1665.5 1102 











Result Diagrams: 


 05/05/20 03:24





 05/05/20 03:24





Phys Exam





- Physical Examination


Intubated and lightly sedated. 


Neck: supple


Respiratory: no wheezing, clear to auscultation bilateral


Cardiovascular: RRR


Gastrointestinal: positive bowel sounds


distended, tympanic to percussion. 


Musculoskeletal: no edema


Neurological: moves all 4 limbs


Skin: no rash





Dx/Plan





- Plan


Plan: 


80yo male with pmh of DMII, CHF & HTN admitted s/p cardiopulmonary arrest w/ 

ROSC.





Cardiopulmonary arrest s/p ROSC


Patient found down at the detention after reportedly being found to be severely 

hypoglycemic & was intubated at detention by EMS. Source of hypoglycemia unknown 

as patient was reportedly hyperglycemic earlier in the day 5/2.


- Plan for extubation today and comfort care.





IDDMII


- SSI available to main BG levels between 140-180 while inpatient.





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


- Give IVFs cautiously. Daily weights & strict I&Os.





Cirrhosis w/ h/o Hep C


- Resume home meds as tolerated & pending clinical course. 





Chronic anemia


- Continue to monitor





OA





Urinary incontinence


- Copeland in place





Dementia 





COVID NEGATIVE





Abx: zosyn


IVFs: LR @ 75mL/hr


GI PPX: Famotidine


DVT PPX: Lovenox


CODE STATUS: FULL 


PCP: CC/assisted





Case discussed with Dr. Hernández











Addendum - Attending





- Attending Attestation


Date/Time: 05/05/20 8420





I personally evaluated the patient and discussed the management with Dr. Gan


I agree with the History, Examination, Assessment and Plan documented above 

with any addition or exceptions noted below- Patient opens eyes; does not 

follow commands. Afebrile VSS. A/P: 1) Cardiac arrest s/p ROSC - no further 

arrhythmias. Poor prpnosis for any meaningful functional recovery. Plan to 

extubate and institute comfort measures. 2) Hypoglycemia- resolved. 3) HfrEF- 

stable.

## 2020-05-05 NOTE — PDOC.CPN
- Subjective


Date: 05/05/20


Time: 17:46


Interval history: 


Was extubated to comfort care. 





- Objective


Allergies/Adverse Reactions: 


 Allergies











Allergy/AdvReac Type Severity Reaction Status Date / Time


 


No Known Allergies Allergy   Verified 05/08/19 21:44











Visit Medications: 


 Current Medications





Dextrose/Water (Dextrose 50%)  25 gm SLOW IVP PRN PRN


   PRN Reason: Hypoglycemia


Enoxaparin Sodium (Lovenox)  40 mg SC 0900 [facility]


   Last Admin: 05/05/20 08:42 Dose:  40 mg


Famotidine (Pepcid)  20 mg SLOW IVP Q12HR [facility]


   Last Admin: 05/05/20 08:42 Dose:  20 mg


Glucagon (Glucagon)  1 mg IM PRN PRN


   PRN Reason: Hypoglycemia


Dextrose/Water (D5w)  1,000 mls @ 0 mls/hr IV .Q0M PRN


   PRN Reason: Hypoglycemia


Lactated Ringer's (Lactated Ringer's)  1,000 mls @ 75 mls/hr IV .[id number] [facility]


   Last Admin: 05/05/20 09:09 Dose:  1,000 mls


Insulin Human Lispro (Humalog)  0 units SC .MILD SLIDING SCALE PRN


   PRN Reason: Mild Correctional Scale


   Last Admin: 05/05/20 10:51 Dose:  2 unit


Insulin Human Lispro (Humalog)  0 units SC .BEDTIME SLIDING SC PRN


   PRN Reason: Bedtime Correctional Scale


Labetalol HCl (Normodyne)  20 mg SLOW IVP Q4H PRN


   PRN Reason: SBP Greater Than 180


Labetalol HCl (Normodyne)  20 mg SLOW IVP NOW [facility]


   Stop: 05/05/20 19:45


Lorazepam (Ativan)  1 mg SLOW IVP Q1H PRN


   PRN Reason: Anxiety/Agitation


Miscellaneous Medication (Ccu Electrolyte Replacement)  1 each FS ONE [facility]


   Stop: 06/02/20 04:37


Morphine Sulfate (Morphine)  4 mg SLOW IVP Q2H PRN


   PRN Reason: Pain


Polyethylene Glycol (Miralax)  17 gm PER TUBE DAILY [facility]


   Last Admin: 05/05/20 09:07 Dose:  17 gm


Scopolamine (Transderm Scop)  1.5 mg TD Q3D PRN


   PRN Reason: Secretions


Sodium Chloride (Flush - Normal Saline)  10 ml IVF PRN PRN


   PRN Reason: Saline Flush








Vital Signs & Weight: 


 Vital Signs











  Temp Pulse Resp Pulse Ox


 


 05/05/20 12:00     100


 


 05/05/20 11:00  98.7 F   


 


 05/05/20 10:00    20 


 


 05/05/20 08:15   100  


 


 05/05/20 08:00    24 H  100


 


 05/05/20 07:00  98.8 F   


 


 05/05/20 06:00    17 








 











Admit Weight                   167 lb 8.8 oz


 


Weight                         168 lb 10.458 oz

















- Physical Exam


General: cachectic


HEENT: normocephaly


Neck: supple neck


Cardiac: tachycardia


Lungs: scattered rhonchi


Neuro: no lateralizing findings


Abdomen: active bowel sounds


Extremities: no edema


Skin: clear


Musculoskeletal: no pain





- Labs


Result Diagrams: 


 05/05/20 03:24





 05/05/20 03:24


 Troponin/CKMB











CK-MB (CK-2)  30.6 ng/mL (0-6.6)  H*  05/03/20  22:50    


 


Troponin I  1.488 ng/mL (< 0.028)  H*  05/03/20  22:50    














- Telemetry


Sinus rhythms and dysrhythmias: sinus rhythm





- Assessment/Plan


Assessment/Plan: 





1. Out of hospital cardiac arrest. 


2. Asystole was initial rhythm.


3. Hypoglycemia, likely trigger


4. 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


5. Anoxic brain injury.





PLAN:


- Out of hospital DNR in place.


- Extubated to comfort care. 


- I think this is reasonable, he has a severe cardiomyopathy, extremely 

deconditioned.  


- Will sign off. Please call with any questions.

## 2020-05-05 NOTE — PRG
DATE OF SERVICE:  05/05/2020



SUBJECTIVE:  He is stable overnight.



OBJECTIVE:  VITAL SIGNS:  Heart rate is 117, blood pressure is 177/99, 
respiratory

rate is in the 20s. 

LUNGS:  Remarkable for rhonchi bilaterally. 

HEART:  Regular rhythm. 

ABDOMEN:  Soft. 

EXTREMITIES:  Without clubbing, cyanosis, or edema.



LABORATORY DATA:  Chest radiograph still shows right lower lobe infiltrate.



IMPRESSION:  Respiratory failure with an out-of-hospital DNR.  The patient was

intubated prior to admission to the hospital. 



Given this fact, I do not think it is appropriate to continue with mechanical

ventilation for a prolonged period of time.  I think his prognosis for any type 
of

functional recovery is extremely poor. 



He has an ejection fraction of 15% to 20% and moderate mitral regurgitation.

Obviously, he is not a candidate for any coronary or cardiac interventions.  He 
was

subsequently extubated today and has been stable postextubation.  He will 
transfer

out of the Critical Care unit for comfort measures. 



Critical care time 35 min.



Job ID:  [id number]



MTDD

## 2020-05-05 NOTE — RAD
Chest one view



HISTORY: Respiratory failure. Follow-up.



COMPARISON: 5/3/2020.



FINDINGS: Cardiac silhouette is magnified and upper limits of normal in size. Mediastinum is shifted 
leftward with the patient rotation.



Pulmonary vasculature slightly engorged. Infiltrate at the right lung base has progressed, partially 
obscuring the right hemidiaphragm.



Calcification within the aortic arch. Endotracheal catheter unchanged in position. Nasogastric tube n
ow descends to the abdomen.



No evidence of pneumothorax. Cardiac monitor leads overlie the chest.



  



IMPRESSION :

Worsening right lower lobe infiltrate.



Increasing pulmonary vascular congestion.



Interval placement nasogastric tube.



Reported By: JERI Fox 

Electronically Signed:  5/5/2020 7:56 AM

## 2020-05-06 LAB
ALBUMIN SERPL BCG-MCNC: 3 G/DL (ref 3.4–4.8)
ALP SERPL-CCNC: 106 U/L (ref 40–110)
ALT SERPL W P-5'-P-CCNC: 50 U/L (ref 8–55)
ANION GAP SERPL CALC-SCNC: 11 MMOL/L (ref 10–20)
AST SERPL-CCNC: 37 U/L (ref 5–34)
BACTERIA UR QL AUTO: (no result) HPF
BASOPHILS # BLD AUTO: 0 THOU/UL (ref 0–0.2)
BASOPHILS NFR BLD AUTO: 0.2 % (ref 0–1)
BILIRUB SERPL-MCNC: 1.2 MG/DL (ref 0.2–1.2)
BUN SERPL-MCNC: 12 MG/DL (ref 8.4–25.7)
CALCIUM SERPL-MCNC: 8 MG/DL (ref 7.8–10.44)
CHLORIDE SERPL-SCNC: 108 MMOL/L (ref 98–107)
CO2 SERPL-SCNC: 23 MMOL/L (ref 23–31)
CREAT CL PREDICTED SERPL C-G-VRATE: 91 ML/MIN (ref 70–130)
EOSINOPHIL # BLD AUTO: 0.3 THOU/UL (ref 0–0.7)
EOSINOPHIL NFR BLD AUTO: 2.6 % (ref 0–10)
GLOBULIN SER CALC-MCNC: 3.1 G/DL (ref 2.4–3.5)
GLUCOSE SERPL-MCNC: 149 MG/DL (ref 83–110)
HGB BLD-MCNC: 10.2 G/DL (ref 14–18)
LEUKOCYTE ESTERASE UR QL STRIP.AUTO: 75 LEU/UL
LYMPHOCYTES # BLD: 1 THOU/UL (ref 1.2–3.4)
LYMPHOCYTES NFR BLD AUTO: 10.4 % (ref 21–51)
MCH RBC QN AUTO: 28.1 PG (ref 27–31)
MCV RBC AUTO: 90.2 FL (ref 78–98)
MONOCYTES # BLD AUTO: 1.3 THOU/UL (ref 0.11–0.59)
MONOCYTES NFR BLD AUTO: 12.9 % (ref 0–10)
MUCOUS THREADS UR QL AUTO: (no result) LPF
NEUTROPHILS # BLD AUTO: 7.2 THOU/UL (ref 1.4–6.5)
NEUTROPHILS NFR BLD AUTO: 73.9 % (ref 42–75)
PLATELET # BLD AUTO: 124 THOU/UL (ref 130–400)
POTASSIUM SERPL-SCNC: 3.9 MMOL/L (ref 3.5–5.1)
PROT UR STRIP.AUTO-MCNC: 30 MG/DL
RBC # BLD AUTO: 3.62 MILL/UL (ref 4.7–6.1)
SODIUM SERPL-SCNC: 138 MMOL/L (ref 136–145)
WBC # BLD AUTO: 9.8 THOU/UL (ref 4.8–10.8)

## 2020-05-06 RX ADMIN — DOCUSATE SODIUM 50 MG AND SENNOSIDES 8.6 MG SCH: 8.6; 5 TABLET, FILM COATED ORAL at 20:43

## 2020-05-06 RX ADMIN — FAMOTIDINE SCH MG: 10 INJECTION, SOLUTION INTRAVENOUS at 09:44

## 2020-05-06 RX ADMIN — FAMOTIDINE SCH MG: 10 INJECTION, SOLUTION INTRAVENOUS at 09:45

## 2020-05-06 RX ADMIN — DOCUSATE SODIUM 50 MG AND SENNOSIDES 8.6 MG SCH: 8.6; 5 TABLET, FILM COATED ORAL at 09:29

## 2020-05-06 NOTE — PQF
CLINICAL DOCUMENTATION IMPROVEMENT CLARIFICATION FORM:  ICD-10 Updated

PLEASE DO AN ADDENDUM TO THE PROGRESS NOTE WITH ANY DOCUMENTATION UPDATES OR 
ADDITIONS AND CARRY THROUGH TO DC SUMMARY.   THANK YOU.



DATE:    5/6/2020                     ATTN: Dr. Gregorio/ Attending Dr. Hernández



Please exercise your independent, professional judgment in responding to the 
clarification form. 

Clinical indicators are provided on the bottom of this form for your review



Please check appropriate box(s) to determine sequence of events:

[ x ] Cardio respiratory arrest

[  ] Respiratory cardiac arrest

[  ] Due to (please specify):

       [  ] Underlying condition 

               [ x ]  Hypoglycemia

               [  ]  Other (please specify) _________________

[  ] Other diagnosis ___________

[  ] Unable to determine



In addition, please specify:

Present on Admission (POA):  [ x ] Yes             [  ] No             [  ] 
Unable to determine



For continuity of documentation, please document condition throughout progress 
notes and discharge summary.  Thank You.



CLINICAL INDICATORS - SIGNS / SYMPTOMS / LABS  /  RESULTS AND LOCATION IN EMR



H&P 5/3: Cardiopulmonary arrest s/p ROSC:

               Pt found down at the retirement after reportedly being found to be 
severely hypoglycemic & was

               intubated at retirement by EMS.

               Source of hypoglycemia unknown as pt was reportedly 
hyperglycemic earlier in the day

               yesterday. 



5/3 (Lily) Apparently he got hypoglycemic after an insulin injection which 
progressed to an

                  unconscious state and asystole.  Ventricular tachycardia was 
noted during CPR, but that 

                  was not what led to the code according to the records.

           Impression:  S/p respiratory arrest related to hypoglycemia, which 
led to a cardiac arrest.



5/5 (Clement) Out of hospital cardiac arrest.

                    Asystole was initial rhythm.

                    Hypoglycemia, likely trigger



RISKS:

H&P 5/3: PMH  Hep C, cirrhosis, dementia, combined heart failure 2/2 
nonischemic CM, 

IDDM type 2

5/4 (Lily) Respiratory failure associated with an out-of-hospital arrest. 



TREATMENT:

ER record: 5/3:EMS reports he lost his pulse, CPR was performed, pt was shocked 
once for V tach and ROSC was achieved. 

Order 5/3: Levophed 250 ml IVPB INF

Order 5/3: Resp: Vent continuous.

______________________________________________________



Thank you,

Radha

(This form is maintained as a part of the permanent medical record)

 2015 [employer]  All Rights Reserved

Radha Krueger RN, BSN    [email or web address]    Cell Phone: [phone]

                                                              

 



[facility]

## 2020-05-06 NOTE — RAD
CHEST ONE VIEW:

5/6/20

 

HISTORY: 

Fever. Extubation. 

 

COMPARISON:  

Radiograph prior day.

 

FINDINGS: 

Similar appearance right lower lobe air space opacity. Patient has been extubated and the enteric tub
e has been removed. Dense calcifications of the aorta. 

 

There is a left basilar opacity.

 

IMPRESSION: 

Bibasilar opacities post extubation.

 

POS: HOME

## 2020-05-07 RX ADMIN — DOCUSATE SODIUM 50 MG AND SENNOSIDES 8.6 MG SCH: 8.6; 5 TABLET, FILM COATED ORAL at 20:01

## 2020-05-07 RX ADMIN — FAMOTIDINE SCH MG: 10 INJECTION, SOLUTION INTRAVENOUS at 08:50

## 2020-05-07 RX ADMIN — FAMOTIDINE SCH MG: 10 INJECTION, SOLUTION INTRAVENOUS at 20:00

## 2020-05-07 RX ADMIN — DOCUSATE SODIUM 50 MG AND SENNOSIDES 8.6 MG SCH: 8.6; 5 TABLET, FILM COATED ORAL at 08:49

## 2020-05-07 NOTE — PDOC.FM
- Subjective


Subjective: 


Overnight, patient had a fever of 102.3. The patient was re-cultured, UA showed 

infection and was started on antibiotics.  This morning, patient is resting, 

comfortably in bed. Patient non verbal. No concerns per nursing. 





- Objective


MAR Reviewed: Yes


Vital Signs & Weight: 


 Vital Signs (12 hours)











  Temp Pulse Resp BP Pulse Ox


 


 05/06/20 23:55  98.4 F  113 H  20  


 


 05/06/20 20:00  102.3 F H  125 H  22 H  139/79  97








 Weight











Admit Weight                   75.999 kg


 


Weight                         76.5 kg











 Most Recent Monitor Data











Heart Rate from ECG            117


 


NIBP                           188/112


 


NIBP BP-Mean                   137


 


Respiration from ECG           27


 


SpO2                           100














I&O: 


 











 05/06/20 05/07/20 05/08/20





 06:59 06:59 06:59


 


Intake Total 740  


 


Output Total 1250 970 


 


Balance -510 -970 











Result Diagrams: 


 05/06/20 05:35





 05/06/20 05:35





Phys Exam





- Physical Examination


Constitutional: NAD


HEENT: moist MMs


Neck: full ROM


rhonchi heard on expiration


Cardiovascular: RRR


Gastrointestinal: soft


Musculoskeletal: no edema


Neurological: moves all 4 limbs


Skin: no rash, normal turgor, cap refill <2 seconds


Deviation from normal: abrasion on mid chest





Dx/Plan


(1) Cardiopulmonary arrest with successful resuscitation


Code(s): I46.9 - CARDIAC ARREST, CAUSE UNSPECIFIED   Status: Acute   





(2) Person under investigation for COVID-19


Code(s): Z20.828 - CONTACT W AND EXPOSURE TO OTH VIRAL COMMUNICABLE DISEASES   

Status: Acute   





(3) Anemia


Code(s): D64.9 - ANEMIA, UNSPECIFIED   Status: Chronic   





(4) Cirrhosis


Code(s): K74.60 - UNSPECIFIED CIRRHOSIS OF LIVER   Status: Chronic   





(5) Dementia


Code(s): F03.90 - UNSPECIFIED DEMENTIA WITHOUT BEHAVIORAL DISTURBANCE   Status: 

Chronic   





(6) Diabetes mellitus, type II


Status: Chronic   


Qualifiers: 


   Diabetes mellitus long term insulin use: unspecified long term insulin use 

status 





(7) Heart failure


Code(s): I50.9 - HEART FAILURE, UNSPECIFIED   Status: Chronic   


Qualifiers: 


   Heart failure type: unspecified 





(8) Hepatitis C


Code(s): B19.20 - UNSPECIFIED VIRAL HEPATITIS C WITHOUT HEPATIC COMA   Status: 

Chronic   





(9) Urinary incontinence


Code(s): R32 - UNSPECIFIED URINARY INCONTINENCE   Status: Chronic   





(10) CHF (congestive heart failure)


Code(s): I50.9 - HEART FAILURE, UNSPECIFIED   Status: Acute   





- Plan


Plan: 


78yo male with pmh of DMII, CHF & HTN admitted s/p cardiopulmonary arrest w/ 

ROSC.





Resp cardiac arrest s/p ROSC


Patient found [facility] at the senior care after reportedly being found to be severely 

hypoglycemic & was intubated at senior care by EMS. Source of hypoglycemia unknown 

as patient was reportedly hyperglycemic earlier in the day 5/2.


- Extubated 5/6 and transitioned to comfort care. Ativan, morphine PRN. 

Scopolamine for secretions.


- CM consulted for dc planning - will need [facility] bed, comfort care.





CAUTI


- UA showing leukocytes, bacteria, turbid


- Will treat with zosyn 5/6


- Tylenol PRN for fever





IDDMII


- SSI available





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


- Daily weights & strict I&Os.





Cirrhosis w/ h/o Hep C


- Resume home meds as tolerated 





Chronic anemia


- Continue to monitor





OA





Urinary incontinence


- Copeland in place





Dementia 





Constipation


- Bowel regimen in place





COVID NEGATIVE








GI PPX: Famotidine


DVT PPX: Lovenox


CODE STATUS: DNAR


PCP: CC/intermediate


Dispo: awaiting [facility] bed





Case discussed with Dr. Hernández








Addendum - Attending





- Attending Attestation


Date/Time: 05/07/20 1310





I personally evaluated the patient and discussed the management with Dr. Gregorio


I agree with the History, Examination, Assessment and Plan documented above 

with any addition or exceptions noted below - Patient nonverbal. Tm102.3 VSS. A/

P: 1) S/p cardiac arrest with ROSC- now nonverbal; unable to follow commands; 

previously with out of hospital DNR; now with comfort care. Continue 

scopalamine for comfort care. 2) UTI- continue abx; await culture. 3) D/c 

planning- awaiting approval for [facility].

## 2020-05-08 RX ADMIN — DOCUSATE SODIUM 50 MG AND SENNOSIDES 8.6 MG SCH TAB: 8.6; 5 TABLET, FILM COATED ORAL at 08:06

## 2020-05-08 RX ADMIN — FAMOTIDINE SCH MG: 10 INJECTION, SOLUTION INTRAVENOUS at 20:00

## 2020-05-08 RX ADMIN — DOCUSATE SODIUM 50 MG AND SENNOSIDES 8.6 MG SCH: 8.6; 5 TABLET, FILM COATED ORAL at 19:54

## 2020-05-08 RX ADMIN — FAMOTIDINE SCH MG: 10 INJECTION, SOLUTION INTRAVENOUS at 08:06

## 2020-05-08 NOTE — PDOC.FM
- Subjective


Subjective: 


NAEO. Patient resting comfortably in bed. 





- Objective


MAR Reviewed: Yes


Vital Signs & Weight: 


 Vital Signs (12 hours)











  Temp Pulse Resp BP Pulse Ox


 


 05/07/20 20:00      95


 


 05/07/20 19:34  98.2 F  122 H  20  162/94 H  95








 Weight











Admit Weight                   75.999 kg


 


Weight                         76.5 kg











 Most Recent Monitor Data











Heart Rate from ECG            117


 


NIBP                           188/112


 


NIBP BP-Mean                   137


 


Respiration from ECG           27


 


SpO2                           100














I&O: 


 











 05/07/20 05/08/20 05/09/20





 06:59 06:59 06:59


 


Intake Total  220 


 


Output Total 970 550 


 


Balance -970 -330 











Result Diagrams: 


 05/06/20 05:35





 05/06/20 05:35





Phys Exam





- Physical Examination


Constitutional: NAD


HEENT: moist MMs, sclera anicteric


Neck: supple, full ROM


rhonchi present b/l


Cardiovascular: RRR


mildly TTP, distended, tympanic


Musculoskeletal: no edema


Neurological: moves all 4 limbs


Deviation from normal: not fully cooperative with exam


Skin: normal turgor, cap refill <2 seconds


Deviation from normal: abrasion on ant. chest





Dx/Plan


(1) Cardiopulmonary arrest with successful resuscitation


Code(s): I46.9 - CARDIAC ARREST, CAUSE UNSPECIFIED   Status: Acute   





(2) Person under investigation for COVID-19


Code(s): Z20.828 - CONTACT W AND EXPOSURE TO OTH VIRAL COMMUNICABLE DISEASES   

Status: Acute   





(3) Anemia


Code(s): D64.9 - ANEMIA, UNSPECIFIED   Status: Chronic   





(4) Cirrhosis


Code(s): K74.60 - UNSPECIFIED CIRRHOSIS OF LIVER   Status: Chronic   





(5) Dementia


Code(s): F03.90 - UNSPECIFIED DEMENTIA WITHOUT BEHAVIORAL DISTURBANCE   Status: 

Chronic   





(6) Diabetes mellitus, type II


Status: Chronic   


Qualifiers: 


   Diabetes mellitus long term insulin use: unspecified long term insulin use 

status 





(7) Heart failure


Code(s): I50.9 - HEART FAILURE, UNSPECIFIED   Status: Chronic   


Qualifiers: 


   Heart failure type: unspecified 





(8) Hepatitis C


Code(s): B19.20 - UNSPECIFIED VIRAL HEPATITIS C WITHOUT HEPATIC COMA   Status: 

Chronic   





(9) Urinary incontinence


Code(s): R32 - UNSPECIFIED URINARY INCONTINENCE   Status: Chronic   





(10) CHF (congestive heart failure)


Code(s): I50.9 - HEART FAILURE, UNSPECIFIED   Status: Acute   





- Plan


Plan: 


80yo male with pmh of DMII, CHF & HTN admitted s/p cardiopulmonary arrest w/ 

ROSC.





Resp cardiac arrest s/p ROSC


[facility] at the [facility] after reportedly being found to be severely 

hypoglycemic & was intubated at [facility] by EMS. Source of hypoglycemia unknown 

as patient was reportedly hyperglycemic earlier in the day 5/2.


- Extubated 5/6 and transitioned to comfort care. Ativan, morphine PRN. 

Scopolamine for secretions.


- CM consulted for dc planning - awaiting [facility] bed





CAUTI


- UA showing leukocytes, bacteria, turbid


- Will treat with zosyn 5/6, can dc after 5/9


- Tylenol PRN for fever





IDDMII


- SSI available





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


- Daily weights & strict I&Os.





Cirrhosis w/ h/o Hep C


- Resume home meds as tolerated 





Chronic anemia


- Continue to monitor





OA





Urinary incontinence


- Copeland in place





Dementia 





Constipation


- Bowel regimen in place





COVID NEGATIVE








GI PPX: Famotidine


DVT PPX: Lovenox


CODE STATUS: DNAR


PCP: CC/FDC


Dispo: awaiting [facility] bed





Case discussed with Dr. Hernández





Addendum - Attending





- Attending Attestation


Date/Time: 05/08/20 7316





I personally evaluated the patient and discussed the management with Dr. Gregorio


I agree with the History, Examination, Assessment and Plan documented above 

with any addition or exceptions noted below - Patient moaning at times. 

Afebrile VSS. A/P: 1) s/p Cardiac arrest with ROSC-  not following commands and 

non-verbal. Continue comfort measures.

## 2020-05-09 RX ADMIN — DOCUSATE SODIUM 50 MG AND SENNOSIDES 8.6 MG SCH: 8.6; 5 TABLET, FILM COATED ORAL at 20:47

## 2020-05-09 RX ADMIN — DOCUSATE SODIUM 50 MG AND SENNOSIDES 8.6 MG SCH TAB: 8.6; 5 TABLET, FILM COATED ORAL at 08:45

## 2020-05-09 NOTE — PDOC.FM
- Subjective


Subjective: 


NAEO. Patient resting comfortably in bed. NO concerns per nursing. Patient had 

a BM yesterday. 





- Objective


MAR Reviewed: Yes


Vital Signs & Weight: 


 Vital Signs (12 hours)











  Temp Pulse Resp BP Pulse Ox


 


 05/08/20 19:50  98.3 F  119 H  29 H  148/93 H  94 L








 Weight











Admit Weight                   75.999 kg


 


Weight                         76.5 kg











 Most Recent Monitor Data











Heart Rate from ECG            117


 


NIBP                           188/112


 


NIBP BP-Mean                   137


 


Respiration from ECG           27


 


SpO2                           100














I&O: 


 











 05/07/20 05/08/20 05/09/20





 06:59 06:59 06:59


 


Intake Total  220 100


 


Output Total 970 550 350


 


Balance -970 -330 -250











Result Diagrams: 


 05/06/20 05:35





 05/06/20 05:35





Phys Exam





- Physical Examination


Constitutional: NAD


HEENT: sclera anicteric


Neck: supple, full ROM


rhonchi b/l; using accessory muscles


Cardiovascular: RRR


Gastrointestinal: soft


Musculoskeletal: no edema


Neurological: moves all 4 limbs


Deviation from normal: not conversative


Skin: no rash, normal turgor, cap refill <2 seconds





Dx/Plan


(1) Cardiopulmonary arrest with successful resuscitation


Code(s): I46.9 - CARDIAC ARREST, CAUSE UNSPECIFIED   Status: Acute   





(2) Person under investigation for COVID-19


Code(s): Z20.828 - CONTACT W AND EXPOSURE TO OTH VIRAL COMMUNICABLE DISEASES   

Status: Acute   





(3) Anemia


Code(s): D64.9 - ANEMIA, UNSPECIFIED   Status: Chronic   





(4) Cirrhosis


Code(s): K74.60 - UNSPECIFIED CIRRHOSIS OF LIVER   Status: Chronic   





(5) Dementia


Code(s): F03.90 - UNSPECIFIED DEMENTIA WITHOUT BEHAVIORAL DISTURBANCE   Status: 

Chronic   





(6) Diabetes mellitus, type II


Status: Chronic   


Qualifiers: 


   Diabetes mellitus long term insulin use: unspecified long term insulin use 

status 





(7) Heart failure


Code(s): I50.9 - HEART FAILURE, UNSPECIFIED   Status: Chronic   


Qualifiers: 


   Heart failure type: unspecified 





(8) Hepatitis C


Code(s): B19.20 - UNSPECIFIED VIRAL HEPATITIS C WITHOUT HEPATIC COMA   Status: 

Chronic   





(9) Urinary incontinence


Code(s): R32 - UNSPECIFIED URINARY INCONTINENCE   Status: Chronic   





(10) CHF (congestive heart failure)


Code(s): I50.9 - HEART FAILURE, UNSPECIFIED   Status: Acute   





- Plan


Plan: 


78yo male with pmh of DMII, CHF & HTN admitted s/p cardiopulmonary arrest w/ 

ROSC.





Resp cardiac arrest s/p ROSC


[facility] after reportedly being found to be severely 

hypoglycemic & was int[facility] by [facility]. Source of hypoglycemia unknown 

as patient was reportedly hyperglycemic earlier in the day 5/2.


- Extubated 5/6 and transitioned to comfort care. Ativan, morphine PRN. 

Scopolamine for secretions.


- CM consulted for dc planning - awaiting [facility] bed





CAUTI, resolved


- UA showing leukocytes, bacteria, turbid on 5/6


- Adequately treated with zosyn


- Tylenol PRN for fever





IDDMII


- SSI available





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


- daily weight, I/Os





Cirrhosis w/ h/o Hep C


- Resume home meds as tolerated 





Chronic anemia


- Continue to monitor





OA





Urinary incontinence


- Copeland in place





Dementia 





Constipation


- Bowel regimen in place





COVID NEGATIVE








GI PPX: Famotidine


DVT PPX: Lovenox


CODE STATUS: DNAR


PCP: CC/jail


Dispo: awaiting [facility] bed, CM working on paperwork





Case discussed with Dr. Hernández





Addendum - Attending





- Attending Attestation


Date/Time: 05/09/20 4064





I personally evaluated the patient and discussed the management with Dr. Gregorio


I agree with the History, Examination, Assessment and Plan documented above 

with any addition or exceptions noted below- Patient in NAD; abdominal 

breathing. Afebrile VSS. A/P: 1) S/p cardiac arrest with ROSC- continue comfort 

care. 2) Ischemic cardiomyopathy - stable. 3) D/c planning - awaiting placement 

at [facility]

## 2020-05-10 RX ADMIN — DOCUSATE SODIUM 50 MG AND SENNOSIDES 8.6 MG SCH: 8.6; 5 TABLET, FILM COATED ORAL at 08:57

## 2020-05-10 RX ADMIN — DOCUSATE SODIUM 50 MG AND SENNOSIDES 8.6 MG SCH: 8.6; 5 TABLET, FILM COATED ORAL at 19:58

## 2020-05-10 NOTE — PDOC.FM
- Subjective


Subjective: 


Overnight, patient fevered to 101.4F and given tylenol. Patient resting 

peacefully in bed. NO concerns per nursing. Patient non verbal. 





- Objective


MAR Reviewed: Yes


Vital Signs & Weight: 


 Vital Signs (12 hours)











  Temp Pulse Resp BP Pulse Ox


 


 05/10/20 05:00  98.8 F    


 


 05/10/20 00:00  99.2 F    


 


 05/09/20 20:33      95


 


 05/09/20 20:00  101.4 F H  95  18  145/61 H  93 L








 Weight











Admit Weight                   75.999 kg


 


Weight                         76.5 kg











 Most Recent Monitor Data











Heart Rate from ECG            117


 


NIBP                           188/112


 


NIBP BP-Mean                   137


 


Respiration from ECG           27


 


SpO2                           100














I&O: 


 











 05/09/20 05/10/20 05/11/20





 06:59 06:59 06:59


 


Intake Total 100 150 


 


Output Total 350 675 


 


Balance -250 -525 











Result Diagrams: 


 05/06/20 05:35





 05/06/20 05:35





Phys Exam





- Physical Examination


Constitutional: NAD


dry MM


Neck: supple


rhonchi b/l; accessory muscle use


Cardiovascular: RRR


Gastrointestinal: soft


Neurological: non-focal


Deviation from normal: non verbal


Skin: no rash, normal turgor, cap refill <2 seconds





Dx/Plan


(1) Cardiopulmonary arrest with successful resuscitation


Code(s): I46.9 - CARDIAC ARREST, CAUSE UNSPECIFIED   Status: Acute   





(2) Person under investigation for COVID-19


Code(s): Z20.828 - CONTACT W AND EXPOSURE TO OTH VIRAL COMMUNICABLE DISEASES   

Status: Acute   





(3) Anemia


Code(s): D64.9 - ANEMIA, UNSPECIFIED   Status: Chronic   





(4) Cirrhosis


Code(s): K74.60 - UNSPECIFIED CIRRHOSIS OF LIVER   Status: Chronic   





(5) Dementia


Code(s): F03.90 - UNSPECIFIED DEMENTIA WITHOUT BEHAVIORAL DISTURBANCE   Status: 

Chronic   





(6) Diabetes mellitus, type II


Status: Chronic   


Qualifiers: 


   Diabetes mellitus long term insulin use: unspecified long term insulin use 

status 





(7) Heart failure


Code(s): I50.9 - HEART FAILURE, UNSPECIFIED   Status: Chronic   


Qualifiers: 


   Heart failure type: unspecified 





(8) Hepatitis C


Code(s): B19.20 - UNSPECIFIED VIRAL HEPATITIS C WITHOUT HEPATIC COMA   Status: 

Chronic   





(9) Urinary incontinence


Code(s): R32 - UNSPECIFIED URINARY INCONTINENCE   Status: Chronic   





(10) CHF (congestive heart failure)


Code(s): I50.9 - HEART FAILURE, UNSPECIFIED   Status: Acute   





- Plan


Plan: 


80yo male with pmh of DMII, CHF & HTN admitted s/p cardiopulmonary arrest w/ 

ROSC.





Resp cardiac arrest s/p ROSC


Patient found down at the halfway after reportedly being found to be severely 

hypoglycemic & was intubated at halfway by EMS. Source of hypoglycemia unknown 

as patient was reportedly hyperglycemic earlier in the day 5/2.


- Extubated 5/6 and transitioned to comfort care. Ativan, morphine PRN. 

Scopolamine for secretions.


- CM consulted for dc planning - Awaiting [facility] bed





CAUTI, resolved


- UA showing leukocytes, bacteria, turbid on 5/6


- Adequately treated with zosyn


- Tylenol PRN for fever





IDDMII


- SSI available





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


- daily weight, I/Os





Cirrhosis w/ h/o Hep C


- Resume home meds as tolerated 





Chronic anemia


- Continue to monitor





OA





Urinary incontinence


- Copeland in place





Dementia 





Constipation


- Bowel regimen in place





COVID NEGATIVE








GI PPX: Famotidine


DVT PPX: Lovenox


CODE STATUS: DNAR


PCP: [facility]


Dispo: awaiting [facility] bed, CM working on paperwork





Case discussed with Dr. Hernández





Addendum - Attending





- Attending Attestation


Date/Time: 05/10/20 8536





I personally evaluated the patient and discussed the management with Dr. Gregorio


I agree with the History, Examination, Assessment and Plan documented above 

with any addition or exceptions noted below - Patient not responsive; Tm 101.4 

P  A/P: 1) S/p cardiac arrest with ROSC - continue comfort care. 2) D/c 

planning- awaiting acceptance by [facility]

## 2020-05-11 VITALS — SYSTOLIC BLOOD PRESSURE: 110 MMHG | DIASTOLIC BLOOD PRESSURE: 67 MMHG

## 2020-05-11 VITALS — TEMPERATURE: 103 F

## 2020-05-11 RX ADMIN — SCOPALAMINE PRN MG: 1 PATCH, EXTENDED RELEASE TRANSDERMAL at 05:02

## 2020-05-11 RX ADMIN — DOCUSATE SODIUM 50 MG AND SENNOSIDES 8.6 MG SCH: 8.6; 5 TABLET, FILM COATED ORAL at 09:16

## 2020-05-11 NOTE — PDOC.BPN
- Brief Progress Note





Progress Note of Death:  


PE:   


HEENT: pupils fixed and dilated   


Card: No pulse, no auscultated heart sounds   


Resp: No spontaneous respirations   


Neuro: No withdrawal to painful stimuli   


TOD: 0942 05/11/2020


 


exact time death was confirmed The family does not request an autopsy.   


 


Orders: 1. TOD 0942   2. Ok to remove all lines   3. Ok to release to

## 2020-05-11 NOTE — PDOC.FM
- Subjective


Subjective: 





pt resting in bed, at baseline per previous reports. not in distress, non verbal





- Objective


Vital Signs & Weight: 


 Vital Signs (12 hours)











  Temp Pulse Resp BP Pulse Ox


 


 20 00:10  98.5 F    


 


 05/10/20 20:00  101.8 F H  128 H  22 H  136/73  91 L








 Weight











Admit Weight                   75.999 kg


 


Weight                         76.5 kg











 Most Recent Monitor Data











Heart Rate from ECG            117


 


NIBP                           188/112


 


NIBP BP-Mean                   137


 


Respiration from ECG           27


 


SpO2                           100














I&O: 


 











 05/10/20 05/11/20 05/12/20





 06:59 06:59 06:59


 


Intake Total 150 30 


 


Output Total 675 700 


 


Balance -525 -670 











Result Diagrams: 


 20 05:35





 20 05:35





Phys Exam





- Physical Examination


Constitutional: NAD


HEENT: moist MMs


Neck: no JVD


Gastrointestinal: no distention


Musculoskeletal: no edema


Skin: no rash





Dx/Plan


(1) Dementia


Code(s): F03.90 - UNSPECIFIED DEMENTIA WITHOUT BEHAVIORAL DISTURBANCE   Status: 

Chronic   





(2) Diabetes mellitus, type II


Status: Chronic   


Qualifiers: 


   Diabetes mellitus long term insulin use: unspecified long term insulin use 

status 





(3) Heart failure


Code(s): I50.9 - HEART FAILURE, UNSPECIFIED   Status: Chronic   


Qualifiers: 


   Heart failure type: unspecified 





(4) Hepatitis C


Code(s): B19.20 - UNSPECIFIED VIRAL HEPATITIS C WITHOUT HEPATIC COMA   Status: 

Chronic   





- Plan


Plan: 





Resp cardiac arrest s/p ROSC


[facility] being found to be severely 

hypoglycemic & [facility]. Source of hypoglycemia unknown 

as patient was reportedly hyperglycemic earlier in the day .


- Extubated  and transitioned to comfort care. Ativan, morphine PRN. 

Scopolamine for secretions.


- CM consulted for dc planning - Awaiting [facility] bed





CAUTI, resolved


- UA showing leukocytes, bacteria, turbid on 


- Adequately treated with zosyn


- Tylenol PRN for fever





IDDMII


- SSI available





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


- daily weight, I/Os





Cirrhosis w/ h/o Hep C


- Resume home meds as tolerated 





Chronic anemia


- Continue to monitor





OA





Urinary incontinence


- Copeland in place





Dementia 





Constipation


- Bowel regimen in place





COVID NEGATIVE








GI PPX: Famotidine


DVT PPX: Lovenox


CODE STATUS: DNAR





Dispo: awaiting [facility] bed, CM working on paperwork





Addendum - Attending





- Attending Attestation


Date/Time: 20 2658





I personally evaluated the patient and discussed the management with Dr. Telles. 


I agree with the History, Examination, Assessment and Plan documented above 

with any addition or exceptions noted below.





Did not see, patient  this morning.

## 2020-05-14 NOTE — EKG
Test Reason : 

Blood Pressure : ***/*** mmHG

Vent. Rate : 065 BPM     Atrial Rate : 065 BPM

   P-R Int : 168 ms          QRS Dur : 102 ms

    QT Int : 536 ms       P-R-T Axes : 076 -34 -69 degrees

   QTc Int : 557 ms

 

Normal sinus rhythm

Left axis deviation

Low voltage QRS

Inferior infarct , age undetermined

Prolonged QT

No STEMI

Abnormal ECG

 

Confirmed by GOLDBERG M.D., ADRIANA ([id number]),  LOTTIE CARBONE ([id number]) on 5/14/2020 4:19:59 PM

 

Referred By:             Confirmed By:ADRIANA GOLDBERG M.D.

## 2020-05-14 NOTE — DIS
DATE OF ADMISSION:  2020



DATE OF DISCHARGE:  2020



TIME OF DEATH:  0942 hours.



CAUSE OF DEATH:  anoxic brain injury, congestive heart failure exacerbation.



SECONDARY DIAGNOSES:  

1. Catheter associated urinary tract infection.

2. Insulin-dependent diabetes mellitus, type 2.

3. Cirrhosis with hep C.

4. Chronic anemia.

5. Osteoarthritis.

6. Dementia.

7. COVID ruled out.



HOSPITAL COURSE:  This is a 79-year-old male with a past medical history 
significant

for the above, found unresponsive and presented earlier today.  It was noted 
that he

was given insulin prior and then was found down with the glucose level of 30,

unconscious at that time.  CPR was began.  EMS reports CPR throughout ambulance 
ride

to the hospital.  ROSC was achieved.  The patient was intubated and started on

Levophed drip.  The patient remained intubated and sedated on sedation holidays 
with

minimally responsive, eventually was determined by the family that patient would

wish to be compassionately extubated.  The patient was transitioned to comfort 
care

measures in our hospital awaiting a hospice bed at [facility] when the patient 
. 







Job ID:  [id number]



MTDD

## 2023-01-16 NOTE — PDOC.FM
- Subjective


Subjective: 


NAEO. Patient resting comfortably in bed. Does not respond verbally. Patient 

does open eyes to command at times and to pain. No concerns/complaints per 

nursing.





- Objective


MAR Reviewed: Yes


Vital Signs & Weight: 


 Vital Signs (12 hours)











  Temp Pulse Resp BP Pulse Ox


 


 05/05/20 20:00  98.7 F  123 H  22 H  166/97 H  99








 Weight











Admit Weight                   75.999 kg


 


Weight                         76.5 kg











 Most Recent Monitor Data











Heart Rate from ECG            117


 


NIBP                           188/112


 


NIBP BP-Mean                   137


 


Respiration from ECG           27


 


SpO2                           100














I&O: 


 











 05/05/20 05/06/20 05/07/20





 06:59 06:59 06:59


 


Intake Total 2314 740 


 


Output Total 1212 1250 


 


Balance 1102 -510 











Result Diagrams: 


 05/06/20 05:35





 05/06/20 05:35





Phys Exam





- Physical Examination


Constitutional: NAD


HEENT: moist MMs, sclera anicteric


Neck: supple, full ROM


Respiratory: clear to auscultation bilateral


Cardiovascular: RRR


Gastrointestinal: soft


Neurological: non-focal


Skin: no rash, normal turgor, cap refill <2 seconds





Dx/Plan


(1) Cardiopulmonary arrest with successful resuscitation


Code(s): I46.9 - CARDIAC ARREST, CAUSE UNSPECIFIED   Status: Acute   





(2) Person under investigation for COVID-19


Code(s): Z20.828 - CONTACT W AND EXPOSURE TO OTH VIRAL COMMUNICABLE DISEASES   

Status: Acute   





(3) Anemia


Code(s): D64.9 - ANEMIA, UNSPECIFIED   Status: Chronic   





(4) Cirrhosis


Code(s): K74.60 - UNSPECIFIED CIRRHOSIS OF LIVER   Status: Chronic   





(5) Dementia


Code(s): F03.90 - UNSPECIFIED DEMENTIA WITHOUT BEHAVIORAL DISTURBANCE   Status: 

Chronic   





(6) Diabetes mellitus, type II


Status: Chronic   


Qualifiers: 


   Diabetes mellitus long term insulin use: unspecified long term insulin use 

status 





(7) Heart failure


Code(s): I50.9 - HEART FAILURE, UNSPECIFIED   Status: Chronic   


Qualifiers: 


   Heart failure type: unspecified 





(8) Hepatitis C


Code(s): B19.20 - UNSPECIFIED VIRAL HEPATITIS C WITHOUT HEPATIC COMA   Status: 

Chronic   





(9) Urinary incontinence


Code(s): R32 - UNSPECIFIED URINARY INCONTINENCE   Status: Chronic   





(10) CHF (congestive heart failure)


Code(s): I50.9 - HEART FAILURE, UNSPECIFIED   Status: Acute   





- Plan


Plan: 


80yo male with pmh of DMII, CHF & HTN admitted s/p cardiopulmonary arrest w/ 

ROSC.





Cardiopulmonary arrest s/p ROSC


Patient found down at the assisted after reportedly being found to be severely 

hypoglycemic & was intubated at assisted by EMS. Source of hypoglycemia unknown 

as patient was reportedly hyperglycemic earlier in the day 5/2.


- Extubated 5/6 and transitioned to comfort care. Ativan, morphine PRN. 

Scopolamine for secretions.


- CM consulted for dc planning - will need [facility] bed, comfort care.





IDDMII


- SSI available





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


- Daily weights & strict I&Os.





Cirrhosis w/ h/o Hep C


- Resume home meds as tolerated & pending clinical course. 





Chronic anemia


- Continue to monitor





OA





Urinary incontinence


- Copeland in place





Dementia 





Constipation


- Will add bowel regimen





COVID NEGATIVE








GI PPX: Famotidine


DVT PPX: Lovenox


CODE STATUS: FULL 


PCP: [facility]





Case discussed with Dr. Hernández








Addendum - Attending





- Attending Attestation


Date/Time: 05/06/20 1322





I personally evaluated the patient and discussed the management with Dr. Gregorio


I agree with the History, Examination, Assessment and Plan documented above 

with any addition or exceptions noted below - Patient appears in NAD; opens 

eyes but no verbal response. Afebrile VSS. A/P: 1) s/p cardiac arrest with ROSC

- now extubated. 2) HFrEF - unable to take po meds; will give via IV those that 

can be converted. 3) D/C planning- will contact case management to assist with 

eventually transfer to [facility]. Name band;